# Patient Record
Sex: FEMALE | Race: WHITE | NOT HISPANIC OR LATINO | Employment: OTHER | URBAN - METROPOLITAN AREA
[De-identification: names, ages, dates, MRNs, and addresses within clinical notes are randomized per-mention and may not be internally consistent; named-entity substitution may affect disease eponyms.]

---

## 2020-03-20 ENCOUNTER — HOSPITAL ENCOUNTER (EMERGENCY)
Facility: HOSPITAL | Age: 65
Discharge: HOME | End: 2020-03-20
Attending: EMERGENCY MEDICINE
Payer: COMMERCIAL

## 2020-03-20 ENCOUNTER — APPOINTMENT (EMERGENCY)
Dept: RADIOLOGY | Facility: HOSPITAL | Age: 65
End: 2020-03-20
Attending: EMERGENCY MEDICINE
Payer: COMMERCIAL

## 2020-03-20 VITALS
HEART RATE: 61 BPM | WEIGHT: 125 LBS | TEMPERATURE: 98.5 F | RESPIRATION RATE: 18 BRPM | OXYGEN SATURATION: 96 % | BODY MASS INDEX: 22.15 KG/M2 | SYSTOLIC BLOOD PRESSURE: 125 MMHG | HEIGHT: 63 IN | DIASTOLIC BLOOD PRESSURE: 66 MMHG
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
## 2020-03-20 DIAGNOSIS — S92.902A CLOSED FRACTURE OF LEFT FOOT, INITIAL ENCOUNTER: Primary | ICD-10-CM

## 2020-03-20 PROCEDURE — 99283 EMERGENCY DEPT VISIT LOW MDM: CPT | Mod: 25

## 2020-03-20 PROCEDURE — 2W3RX1Z IMMOBILIZATION OF LEFT LOWER LEG USING SPLINT: ICD-10-PCS | Performed by: EMERGENCY MEDICINE

## 2020-03-20 PROCEDURE — 73610 X-RAY EXAM OF ANKLE: CPT | Mod: LT

## 2020-03-20 PROCEDURE — 29515 APPLICATION SHORT LEG SPLINT: CPT | Mod: LT

## 2020-03-20 PROCEDURE — 63700000 HC SELF-ADMINISTRABLE DRUG: Performed by: PHYSICIAN ASSISTANT

## 2020-03-20 PROCEDURE — 73630 X-RAY EXAM OF FOOT: CPT | Mod: LT

## 2020-03-20 RX ORDER — LISINOPRIL AND HYDROCHLOROTHIAZIDE 10; 12.5 MG/1; MG/1
1 TABLET ORAL DAILY
COMMUNITY
Start: 2015-11-24 | End: 2023-03-12

## 2020-03-20 RX ORDER — IBUPROFEN 600 MG/1
600 TABLET ORAL ONCE
Status: COMPLETED | OUTPATIENT
Start: 2020-03-20 | End: 2020-03-20

## 2020-03-20 RX ORDER — METFORMIN HYDROCHLORIDE 1000 MG/1
TABLET ORAL
COMMUNITY
Start: 2020-03-07 | End: 2023-03-12

## 2020-03-20 RX ORDER — ATORVASTATIN CALCIUM 40 MG/1
TABLET, FILM COATED ORAL
COMMUNITY
Start: 2020-03-07 | End: 2024-01-20 | Stop reason: SDUPTHER

## 2020-03-20 RX ORDER — AMLODIPINE BESYLATE 2.5 MG/1
TABLET ORAL
COMMUNITY
Start: 2020-03-18 | End: 2023-03-12

## 2020-03-20 RX ADMIN — IBUPROFEN 600 MG: 600 TABLET ORAL at 22:16

## 2020-03-20 ASSESSMENT — ENCOUNTER SYMPTOMS
SHORTNESS OF BREATH: 0
FEVER: 0
ABDOMINAL PAIN: 0
BRUISES/BLEEDS EASILY: 0
ARTHRALGIAS: 1
WEAKNESS: 0
DIZZINESS: 0
JOINT SWELLING: 1
COLOR CHANGE: 1
WOUND: 0
NUMBNESS: 0
HEADACHES: 0
BACK PAIN: 0

## 2020-03-21 NOTE — DISCHARGE INSTRUCTIONS
Apply ice for 15 minutes at a time, at least 4 times daily, for the next 48-72 hours. Elevate areas of swelling above chest.     Take 600 mg of Motrin with food every 6 hours as needed for pain as instructed on the bottle    Take 650 mg of Tylenol every 4 hours as needed for pain as instructed on the bottle     Keep splint in place and no weight bearing until cleared by orthopedics    Return to the emergency department for worsening of symptoms or if you develop any new concerning symptoms including   foot numbness, toes becomes pale, unable to move toes, or increased foot pain or swelling.

## 2020-03-21 NOTE — ED PROVIDER NOTES
HPI     Chief Complaint   Patient presents with   • Lower Extremity Issue       65 y/o F with hx of HLD, HTN, T2DM presents to ED for evaluation post fall. Pt reports walking out of her neighbors home when she missed the last cement step and inverted left ankle. Pt c/o left ankle and foot pain, aching, 2/10 at rest, and 6/10 with bearing weight/movement. Unable to bear weight since fall due to pain. No medications for pain taken PTA. She felt fine prior to the fall. She did not hit her head or have LOC. She is not on blood thinners. Denies any other injuries or concerns.       History provided by:  Patient  Lower Extremity Issue   Associated symptoms: no back pain and no fever         Patient History     Past Medical History:   Diagnosis Date   • Hypertension    • Lipid disorder    • Type 2 diabetes mellitus (CMS/HCC)        History reviewed. No pertinent surgical history.    History reviewed. No pertinent family history.    Social History     Tobacco Use   • Smoking status: Never Smoker   • Smokeless tobacco: Never Used   Substance Use Topics   • Alcohol use: Not Currently   • Drug use: Never       Systems Reviewed from Nursing Triage:          Review of Systems     Review of Systems   Constitutional: Negative for fever.   HENT: Positive for hearing loss.    Respiratory: Negative for shortness of breath.    Cardiovascular: Negative for chest pain.   Gastrointestinal: Negative for abdominal pain.   Musculoskeletal: Positive for arthralgias (left foot/ankle), gait problem and joint swelling (left foot). Negative for back pain.   Skin: Positive for color change (bruising left foot). Negative for wound.   Neurological: Negative for dizziness, syncope, weakness, numbness and headaches.   Hematological: Does not bruise/bleed easily.        Physical Exam     ED Triage Vitals [03/20/20 2126]   Temp Heart Rate Resp BP SpO2   36.9 °C (98.5 °F) 61 18 125/66 96 %      Temp Source Heart Rate Source Patient Position BP Location  "FiO2 (%) (Set)   Tympanic -- Sitting Right upper arm --       Pulse Ox %: 96 % (03/20/20 2126)  Pulse Ox Interpretation: Normal (03/20/20 2126)          Patient Vitals for the past 24 hrs:   BP Temp Temp src Pulse Resp SpO2 Height Weight   03/20/20 2126 125/66 36.9 °C (98.5 °F) Tympanic 61 18 96 % 1.6 m (5' 3\") 56.7 kg (125 lb)                                       Physical Exam   Constitutional: She appears well-developed and well-nourished.   HENT:   Head: Normocephalic and atraumatic.   [place]   Neck: Neck supple. No spinous process tenderness and no muscular tenderness present.   Cardiovascular:   Pulses:       Dorsalis pedis pulses are 2+ on the left side.        Posterior tibial pulses are 2+ on the left side.   Pulmonary/Chest: No respiratory distress.   Musculoskeletal:   Mild TTP left 4th-5th MTP TTP, mild swelling proximal 5th MTP, no deformity or any other foot TTP    Mild lateral distal malleolus TTP, no swelling or deformity    Left plantarflexion and extension intact    Left proximal tib/fib NTTP, no swelling     FAROM BL UE, RLE, left hip, left knee without pain, no swelling or bony TTP    Back: NTTP   Neurological: She is alert.   Left foot NVI   Skin: Skin is warm and dry. Capillary refill takes less than 2 seconds.   Ecchymosis left lateral proximal foot   Psychiatric: She has a normal mood and affect.   Nursing note and vitals reviewed.           Splint Application  Date/Time: 3/20/2020 11:01 PM  Performed by: Denia Fitzpatrick PA C  Authorized by: Brandyn Doherty DO     Consent:     Consent obtained:  Verbal    Consent given by:  Patient    Risks discussed:  Nerve damage, pain and vascular damage  Injury:     Injury location: left foot.  Pre-procedure assessment:     Neurological function: normal      Distal perfusion: normal      Range of motion: normal    Procedure details:     Immobilization:  Splint    Splint type: posterior.    Supplies used:  Elastic bandage, cotton padding and " Ortho-Glass  Post-procedure assessment:     Neurological function: normal      Distal perfusion: normal      Patient tolerance of procedure:  Tolerated well, no immediate complications        ED Course & MDM     Labs Reviewed - No data to display    X-RAY ANKLE LEFT 3+ VIEWS   ED Interpretation   No acute fx      Final Result   IMPRESSION:      1.  Transverse fracture through the distal left fifth metatarsal with mild   associated angulation.   2.  No acute fracture identified about the left ankle.   3.  Osteopenia.      X-RAY FOOT LEFT 3+ VIEWS   ED Interpretation   Distal 5th MTP fx      Final Result   IMPRESSION:      1.  Transverse fracture through the distal left fifth metatarsal with mild   associated angulation.   2.  No acute fracture identified about the left ankle.   3.  Osteopenia.                  MDM  Number of Diagnoses or Management Options  Closed fracture of left foot, initial encounter:   Fall, initial encounter:      Amount and/or Complexity of Data Reviewed  Tests in the radiology section of CPT®: ordered and reviewed  Independent visualization of images, tracings, or specimens: yes    Patient Progress  Patient progress: improved           ED Course as of Mar 21 1305   Fri Mar 20, 2020   2150 Plan: XR left ankle and foot to r/o fx, motrin, ice    [TC]   8330 Pt updated on fx, posterior splint applied. Will do trial with crutches. No weight bearing discussed. FU with ortho advised. APAP and Motrin for pain recommended, pt declines narcotics. Elevation and ice advised. Sx for returning to the ED reviewed. All questions answered. Pt has ride home     [TC]      ED Course User Index  [TC] Denia Fitzpatrick PA C         Clinical Impressions as of Mar 21 1305   Closed fracture of left foot, initial encounter   Fall, initial encounter     Disposition  Discharged      Denia Fitzpatrick PA C  03/21/20 1306

## 2020-03-21 NOTE — ED ATTESTATION NOTE
I have personally seen and examined the patient.  I reviewed and agree with physician assistant / nurse practitioner’s assessment and plan of care, with the following exceptions: None  My examination, assessment, and plan of care of Talita Jacques is as follows:     Exam: Well-appearing, no acute distress, awake alert oriented x3, left lower extremity with mild swelling to the lateral malleolus, tenderness to palpation to fifth metatarsal, neurovascular intact distally, no proximal fibular tenderness,    Assessment and plan: Patient with inversion injury, apparent fracture to the distal fifth metatarsal, splinted, symptomatic treatment and outpatient follow-up    Pulse ox 96% normal     Brandyn Doherty, DO  03/21/20 0114

## 2021-04-14 DIAGNOSIS — Z23 ENCOUNTER FOR IMMUNIZATION: ICD-10-CM

## 2022-07-15 ENCOUNTER — APPOINTMENT (EMERGENCY)
Dept: RADIOLOGY | Facility: HOSPITAL | Age: 67
End: 2022-07-15
Attending: EMERGENCY MEDICINE
Payer: COMMERCIAL

## 2022-07-15 ENCOUNTER — HOSPITAL ENCOUNTER (EMERGENCY)
Facility: HOSPITAL | Age: 67
Discharge: HOME | End: 2022-07-15
Attending: EMERGENCY MEDICINE
Payer: COMMERCIAL

## 2022-07-15 VITALS
BODY MASS INDEX: 22.08 KG/M2 | TEMPERATURE: 97.8 F | RESPIRATION RATE: 18 BRPM | HEART RATE: 56 BPM | HEIGHT: 62 IN | DIASTOLIC BLOOD PRESSURE: 66 MMHG | WEIGHT: 120 LBS | SYSTOLIC BLOOD PRESSURE: 142 MMHG | OXYGEN SATURATION: 96 %

## 2022-07-15 DIAGNOSIS — S52.501A CLOSED FRACTURE OF DISTAL END OF RIGHT RADIUS, UNSPECIFIED FRACTURE MORPHOLOGY, INITIAL ENCOUNTER: Primary | ICD-10-CM

## 2022-07-15 PROCEDURE — 73110 X-RAY EXAM OF WRIST: CPT | Mod: RT

## 2022-07-15 PROCEDURE — 63600000 HC DRUGS/DETAIL CODE: Performed by: PHYSICIAN ASSISTANT

## 2022-07-15 PROCEDURE — 3E033GC INTRODUCTION OF OTHER THERAPEUTIC SUBSTANCE INTO PERIPHERAL VEIN, PERCUTANEOUS APPROACH: ICD-10-PCS | Performed by: EMERGENCY MEDICINE

## 2022-07-15 PROCEDURE — 0PSHXZZ REPOSITION RIGHT RADIUS, EXTERNAL APPROACH: ICD-10-PCS | Performed by: EMERGENCY MEDICINE

## 2022-07-15 PROCEDURE — 96372 THER/PROPH/DIAG INJ SC/IM: CPT

## 2022-07-15 PROCEDURE — 2W3CX1Z IMMOBILIZATION OF RIGHT LOWER ARM USING SPLINT: ICD-10-PCS | Performed by: EMERGENCY MEDICINE

## 2022-07-15 PROCEDURE — 99283 EMERGENCY DEPT VISIT LOW MDM: CPT | Mod: 25

## 2022-07-15 PROCEDURE — 73100 X-RAY EXAM OF WRIST: CPT | Mod: RT,59

## 2022-07-15 RX ORDER — HYDROMORPHONE HYDROCHLORIDE 1 MG/ML
1 INJECTION, SOLUTION INTRAMUSCULAR; INTRAVENOUS; SUBCUTANEOUS ONCE
Status: COMPLETED | OUTPATIENT
Start: 2022-07-15 | End: 2022-07-15

## 2022-07-15 RX ORDER — OXYCODONE AND ACETAMINOPHEN 5; 325 MG/1; MG/1
1 TABLET ORAL EVERY 6 HOURS PRN
Qty: 10 TABLET | Refills: 0 | Status: SHIPPED | OUTPATIENT
Start: 2022-07-15 | End: 2022-07-19

## 2022-07-15 RX ADMIN — HYDROMORPHONE HYDROCHLORIDE 1 MG: 1 INJECTION, SOLUTION INTRAMUSCULAR; INTRAVENOUS; SUBCUTANEOUS at 11:33

## 2022-07-15 ASSESSMENT — ENCOUNTER SYMPTOMS
BRUISES/BLEEDS EASILY: 0
COLOR CHANGE: 1
BACK PAIN: 0
WEAKNESS: 0
NECK PAIN: 0
DIZZINESS: 0
WOUND: 1
NUMBNESS: 0
FEVER: 0
FACIAL SWELLING: 0

## 2022-07-15 NOTE — ED ATTESTATION NOTE
I have personally seen and examined the patient.  I reviewed and agree with physician assistant / nurse practitioner’s assessment and plan of care.     Exam: Evaluation of the patient's right upper extremity reveals significant deformity at the distal radius concerning for fracture.  The upper extremity is otherwise unremarkable.  The extremity is neurovascularly intact.  Patient is otherwise atraumatic.    Plan: Patient suffer from a FOOSH injury to the right wrist.  Will check x-rays to further evaluate.  Will apply ice.  Patient was offered pain medications but declined at this time           Marc Bashir DO  07/15/22 1038

## 2022-07-15 NOTE — ED PROVIDER NOTES
Emergency Medicine Note  HPI   HISTORY OF PRESENT ILLNESS     67-year-old female history of hypertension, hyperlipidemia, type 2 diabetes presents emergency room for evaluation of right wrist pain.  Patient was walking her dogs down her wooden steps today when they pulled the leash out of her hand, she fell forward onto her outstretched right hand complains of pain and a deformity to her right wrist.  She does not think that she hit her head, although she does have bruising to her nasal bridge.  She did not lose consciousness, she did not feel dizzy or weak prior to the fall.  No other injury sustained.  Tetanus UTD.      History provided by:  Patient        Patient History   PAST HISTORY     Reviewed from Nursing Triage:         Past Medical History:   Diagnosis Date   • Hypertension    • Lipid disorder    • Type 2 diabetes mellitus (CMS/HCC)        History reviewed. No pertinent surgical history.    History reviewed. No pertinent family history.    Social History     Tobacco Use   • Smoking status: Never Smoker   • Smokeless tobacco: Never Used   Substance Use Topics   • Alcohol use: Not Currently   • Drug use: Never         Review of Systems   REVIEW OF SYSTEMS     Review of Systems   Constitutional: Negative for fever.   HENT: Negative for facial swelling and nosebleeds.    Musculoskeletal: Negative for back pain and neck pain.   Skin: Positive for color change and wound.   Allergic/Immunologic: Negative for immunocompromised state.   Neurological: Negative for dizziness, weakness and numbness.   Hematological: Does not bruise/bleed easily.   All other systems reviewed and are negative.        VITALS     ED Vitals    Date/Time Temp Pulse Resp BP SpO2 [facility]   07/15/22 1008 36.6 °C (97.8 °F) 56 18 142/66 96 % DMM                       Physical Exam   PHYSICAL EXAM     Physical Exam  Vitals and nursing note reviewed.   Constitutional:       Appearance: Normal appearance.   HENT:      Head: Normocephalic.       Comments: Ecchymosis and slight rightward deformity of nasal bridge, minimal tenderness, no septal hematoma  Chest:      Chest wall: No tenderness.   Musculoskeletal:      Right shoulder: Normal.      Left shoulder: Normal.      Right elbow: Normal.      Right wrist: Swelling, deformity (ventral deformity) and bony tenderness (diffuse bony) present. No effusion or lacerations. Decreased range of motion (unable to flex past 75 degrees 2/2 deformity). Normal pulse.      Comments: Sensation intact, intact  strength, <2 sec cap refill, color and temperature normal   Skin:     General: Skin is warm and dry.      Comments: Small skin tear to left forearm   Neurological:      General: No focal deficit present.      Mental Status: She is alert and oriented to person, place, and time.           PROCEDURES     Procedures     DATA     Results     None          Imaging Results          X-RAY WRIST RIGHT 2 VIEWS (Final result)  Result time 07/15/22 12:45:17   Procedure changed from X-RAY WRIST RIGHT 3+ VIEWS     Final result                 Impression:    IMPRESSION: No significant change status post cast placement             Narrative:    CLINICAL HISTORY: Status post reduction right wrist fracture.    COMMENT: 2 views of the right wrist were performed July 15, 2022 at 1219 hours.  This study is compared to the previous examination performed at 1039 hours.  There is overlying cast material which obscures detail. Radial fracture is not  significantly changed.                               X-RAY WRIST RIGHT 3+ VIEWS (Final result)  Result time 07/15/22 11:30:55    Final result                 Impression:    IMPRESSION: Suspect acute on chronic distal radial fracture.             Narrative:    Right wrist x-rays    CLINICAL HISTORY: Injury.    COMPARISON: February 14, 2010.    COMMENT: 4 views of the right wrist obtained. A fracture lucency is noted  through the distal radius with some callus formation and surrounding  sclerosis  suggesting healed fibrous union, however, there is suggestion of a acute on  chronic fracture based on the oblique images. Carpal bones are intact.  Degenerative changes at the first CMC joint.                                No orders to display       Scoring tools                                 ED Course & MDM   MDM / ED COURSE / CLINICAL IMPRESSIONS / DISPO     MDM    ED Course as of 07/15/22 1359   Fri Jul 15, 2022   1104 Angulated right distal radius fracture seen on xray  Call to ortho    Based on physical exam, I discussed the possibility of nasal fracture with patient. No septal hematoma. No indication for further imaging, patient can f/u as needed [EK]   1115 Ortho resident will evaluate patient  Pain meds ordered [EK]   1158 Ortho resident at bedside [EK]   1217 Ortho resident reduced and splinted patient, will obtain postreduction films.  Plan to discharge home with pain medication and follow-up with Dr. Galvan within 1 week [EK]   1310 Postreduction film unchanged  D/w ortho resident, OK for d/c home to f/u as outpatient with Dr. Galvan to discuss surgery  Pain meds sent to pt's pharmacy [EK]      ED Course User Index  [EK] Miley Engle PA C         Clinical Impressions as of 07/15/22 1359   Closed fracture of distal end of right radius, unspecified fracture morphology, initial encounter              Miley Engle PA C  07/15/22 1358       Miley Engle PA C  07/15/22 1359

## 2022-07-15 NOTE — CONSULTS
Orthopaedic Surgery Consult    CC: Right wrist pain    SUBJECTIVE     HPI: Talita Jacques is a 67 y.o. female with PMH HTN, HLD, DM and history of right wrist fracture s/p ORIF by Dr. Galvan s/p plate removal many years ago who is presenting with right wrist pain after a fall, orthopaedic surgery consulted for right distal radius fracture.    Patient was walking her dog earlier today when the dog pulled on her leash and she fell on an outstretched hand. She does also have bruising to her nose and scrape along left arm. She was brought to ER by her . Her pain is controlled at rest, worse with movement of right forearm.    Patient denies any numbness or tingling. She denies significant pain to any other extremity. She is afebrile and hemodynamically stable.    Baseline ambulatory status: independent ambulator    PMH:  Past Medical History:   Diagnosis Date   • Hypertension    • Lipid disorder    • Type 2 diabetes mellitus (CMS/HCC)        PSH:  History reviewed. No pertinent surgical history.    Meds:  No current facility-administered medications on file prior to encounter.     Current Outpatient Medications on File Prior to Encounter   Medication Sig Dispense Refill   • amLODIPine (NORVASC) 2.5 mg tablet      • atorvastatin (LIPITOR) 40 mg tablet      • lisinopriL-hydrochlorothiazide (PRINZIDE,ZESTORETIC) 10-12.5 mg per tablet Take 1 tablet by mouth daily.     • metFORMIN (GLUCOPHAGE) 1,000 mg tablet          Allergies:   No Known Allergies    SH:   Social History     Socioeconomic History   • Marital status:      Spouse name: Not on file   • Number of children: Not on file   • Years of education: Not on file   • Highest education level: Not on file   Occupational History   • Not on file   Tobacco Use   • Smoking status: Never Smoker   • Smokeless tobacco: Never Used   Substance and Sexual Activity   • Alcohol use: Not Currently   • Drug use: Never   • Sexual activity: Defer   Other Topics Concern   •  Not on file   Social History Narrative   • Not on file     Social Determinants of Health     Financial Resource Strain: Not on file   Food Insecurity: No Food Insecurity   • Worried About Running Out of Food in the Last Year: Never true   • Ran Out of Food in the Last Year: Never true   Transportation Needs: Not on file   Physical Activity: Not on file   Stress: Not on file   Social Connections: Not on file   Intimate Partner Violence: Not on file   Housing Stability: Not on file           ROS:  CV: Denies CP or Palpitations.  Pulm: Denies SOB or Pain with inspiration      OBJECTIVE  Vitals:    07/15/22 1008   BP: (!) 142/66   Pulse: (!) 56   Resp: 18   Temp: 36.6 °C (97.8 °F)   SpO2: 96%       CBC Results    No lab values to display.         Physical Exam:    General  No acute distress  AAOx3    Right Upper Extremity  Inspection: Mild dorsal deformity of distal forearm, skin closed with no open wounds  Palpation: TTP over distal radius  Motor Exam: Fires D/B/T/WF/WE/FF/HI  Sensory: SILT in A/M/R/U Nerve Distributions  Vascular: Palpable radial pulse, Brisk capillary refill  ROM: Wrist ROM limited in setting of acute fracture    Left Upper Extremity  Inspection: Superficial scrape to the skin of the dorsal forearm, no obvious deformity  Palpation: No bony tenderness throughout  Motor Exam: Fires D/B/T/WF/WE/FF/HI  Sensory: SILT in A/M/R/U Nerve Distributions  Vascular: Palpable radial pulse, Brisk capillary refill  ROM: Full, passive and active    Right Lower Extremity  Inspection: Atraumatic, no obvious deformity, skin intact  Palpation: No TTP throughout  Motor: Fires IP/Q/TA/EHL/GS  Sensory: SILT SPN/DPN/T/S/S distributions  Vascular: Palpable DP/PT pulses, Toes WWP  ROM: Full, passive and active    Left Lower Extremity  Inspection: Atraumatic, no obvious deformity, skin intact  Palpation: No TTP throughout  Motor: Fires IP/Q/TA/EHL/GS  Sensory: SILT SPN/DPN/T/S/S distributions  Vascular: Palpable DP/PT pulses,  Toes WWP  ROM: Full, passive and active    Imaging:  I have reviewed the Imaging from the last 24 hrs. XRs of the right wrist demonstrate dorsally displaced extra-articular distal radius fracture      ASSESSMENT & PLAN   67 y.o. female with prior R wrist fracture ORIF s/p hardware removal presenting with acute closed NV intact right distal radius fracture    -- 8 ccs of 1% lidocaine administered into fracture site for hematoma block  -- Closed fracture reduction and sugar tong splint performed at bedside in ER  -- Post reduction XRs show adequate alignment, post NV exam stable  -- Patient prefers to follow up with Dr. Galvan for further management and care    Albert Molina MD  PGY-2, Orthopaedic Surgery

## 2022-07-15 NOTE — DISCHARGE INSTRUCTIONS
Alternate tylenol and ibuprofen as needed for mild to moderate pain. Do not take the prescribed pain medication within 4 hours of taking tylenol as it also contains tylenol, do not exceed 3g tylenol (acetaminophen) per day.    Elevate your right wrist at rest to help reduce swelling.    Do not get the splint wet, wear a plastic bag over the splint while bathing.

## 2023-03-12 ENCOUNTER — APPOINTMENT (EMERGENCY)
Dept: RADIOLOGY | Facility: HOSPITAL | Age: 68
End: 2023-03-12
Payer: COMMERCIAL

## 2023-03-12 ENCOUNTER — HOSPITAL ENCOUNTER (EMERGENCY)
Facility: HOSPITAL | Age: 68
Discharge: HOME | End: 2023-03-12
Attending: EMERGENCY MEDICINE
Payer: COMMERCIAL

## 2023-03-12 VITALS
TEMPERATURE: 98.6 F | WEIGHT: 125 LBS | HEIGHT: 62 IN | BODY MASS INDEX: 23 KG/M2 | HEART RATE: 58 BPM | RESPIRATION RATE: 16 BRPM | SYSTOLIC BLOOD PRESSURE: 124 MMHG | DIASTOLIC BLOOD PRESSURE: 64 MMHG | OXYGEN SATURATION: 95 %

## 2023-03-12 DIAGNOSIS — M54.50 ACUTE LEFT-SIDED LOW BACK PAIN WITHOUT SCIATICA: Primary | ICD-10-CM

## 2023-03-12 PROCEDURE — 72100 X-RAY EXAM L-S SPINE 2/3 VWS: CPT

## 2023-03-12 PROCEDURE — 63700000 HC SELF-ADMINISTRABLE DRUG: Performed by: EMERGENCY MEDICINE

## 2023-03-12 PROCEDURE — 63700000 HC SELF-ADMINISTRABLE DRUG: Performed by: PHYSICIAN ASSISTANT

## 2023-03-12 PROCEDURE — 99283 EMERGENCY DEPT VISIT LOW MDM: CPT | Mod: 25

## 2023-03-12 RX ORDER — CYCLOBENZAPRINE HCL 5 MG
10 TABLET ORAL ONCE
Status: COMPLETED | OUTPATIENT
Start: 2023-03-12 | End: 2023-03-12

## 2023-03-12 RX ORDER — LISINOPRIL 10 MG/1
10 TABLET ORAL DAILY
COMMUNITY
Start: 2023-02-18 | End: 2024-01-20 | Stop reason: SDUPTHER

## 2023-03-12 RX ORDER — LIDOCAINE 560 MG/1
1 PATCH PERCUTANEOUS; TOPICAL; TRANSDERMAL ONCE
Status: DISCONTINUED | OUTPATIENT
Start: 2023-03-12 | End: 2023-03-12 | Stop reason: HOSPADM

## 2023-03-12 RX ORDER — ACETAMINOPHEN 325 MG/1
975 TABLET ORAL ONCE
Status: COMPLETED | OUTPATIENT
Start: 2023-03-12 | End: 2023-03-12

## 2023-03-12 RX ORDER — ALENDRONATE SODIUM 70 MG/1
70 TABLET ORAL WEEKLY
COMMUNITY
Start: 2023-01-09 | End: 2023-12-11 | Stop reason: ALTCHOICE

## 2023-03-12 RX ORDER — METHYLPREDNISOLONE 4 MG/1
4 TABLET ORAL SEE ADMIN INSTRUCTIONS
Qty: 1 TABLET | Refills: 0 | Status: SHIPPED | OUTPATIENT
Start: 2023-03-12 | End: 2023-06-11 | Stop reason: ALTCHOICE

## 2023-03-12 RX ORDER — CYCLOBENZAPRINE HCL 10 MG
10 TABLET ORAL 3 TIMES DAILY PRN
Qty: 15 TABLET | Refills: 0 | Status: SHIPPED | OUTPATIENT
Start: 2023-03-12 | End: 2023-12-11 | Stop reason: ALTCHOICE

## 2023-03-12 RX ORDER — METFORMIN HYDROCHLORIDE 500 MG/1
500 TABLET ORAL 2 TIMES DAILY WITH MEALS
COMMUNITY
Start: 2023-02-17 | End: 2023-12-19 | Stop reason: SDUPTHER

## 2023-03-12 RX ADMIN — LIDOCAINE 1 PATCH: 4 PATCH TOPICAL at 14:16

## 2023-03-12 RX ADMIN — CYCLOBENZAPRINE HYDROCHLORIDE 10 MG: 5 TABLET, FILM COATED ORAL at 14:52

## 2023-03-12 RX ADMIN — ACETAMINOPHEN 975 MG: 325 TABLET ORAL at 14:15

## 2023-03-12 ASSESSMENT — ENCOUNTER SYMPTOMS
FEVER: 0
WEAKNESS: 0
NUMBNESS: 0
ABDOMINAL PAIN: 0
DYSURIA: 0
CHILLS: 0

## 2023-03-12 NOTE — ED ATTESTATION NOTE
I have personally seen and examined Talita Jacques. I personally performed the key components of the encounter and provided a substantive portion of the care and medical decision making for this patient.    I reviewed and agree with physician assistant’s assessment and plan of care, with any exceptions as documented below.     My focused history, examination, assessment, and plan of care of Talita Jacques is as follows:    Brief History:    67F w/DM, htn, p/w left lower back pain for the past 6 days following carrying her >35 lbs grandchild.  No saddle anesthesia, urinary retention nor stool incontinence.  No bloody stools, urinary sx, nor hematuria.  Denies any LE numb/weak.  Describes the pain as squeezing  Focused Physical Exam:   Vitals:    03/12/23 1345   BP: (!) 123/55   Pulse: 75   Resp: 16   Temp: 37 °C (98.6 °F)   SpO2: 97%   aa, good eye contact, pleasant, cooperative, no resp distress, full dorsiflexion of rustam great toes with intact full sensations to light touch in rustam LE.  Mild left lower perispinal soft tissue ttp without any swelling, midline stepoffs nor tenderness      Assessment / Plan / MDM:  Clinical history and exam concerning for but not limited to:  Lumbar radiculopathy  Will prpvde flexeril, tylenol, aspercream  Obtain xr       Dieudonne Mueller MD  03/12/23 1418

## 2023-03-12 NOTE — DISCHARGE INSTRUCTIONS
Do not drink drive or operate machinery while taking flexeril;  The steriods will effect your blood sugar please watch them closely ;

## 2023-03-12 NOTE — ED PROVIDER NOTES
Emergency Medicine Note  HPI   HISTORY OF PRESENT ILLNESS     Patient reports she has had 5 or 6 days of left-sided low back pain into her left buttocks without radiation to her legs.  States is worse with movement and twisting better with meaning still.  Patient states she did take Tylenol earlier in the week with minimal relief.  Denies any fall injury or trauma.  She denies  any fever chills or night sweats.  She states she had no saddle anesthesia.  No bowel or bladder dysfunction.  Patient states she does have a 30 pound grandchild thought maybe she strained her back as the pain has been persistent over the last 5 to 6 days she came to the ER today for evaluation and treatment.            Patient History   PAST HISTORY     Reviewed from Nursing Triage:       Past Medical History:   Diagnosis Date   • Hypertension    • Lipid disorder    • Type 2 diabetes mellitus (CMS/HCC)        Past Surgical History:   Procedure Laterality Date   • WRIST FRACTURE SURGERY Right        History reviewed. No pertinent family history.    Social History     Tobacco Use   • Smoking status: Former     Types: Cigarettes   • Smokeless tobacco: Never   Substance Use Topics   • Alcohol use: Not Currently   • Drug use: Never         Review of Systems   REVIEW OF SYSTEMS     Review of Systems   Constitutional: Negative for chills and fever.   Gastrointestinal: Negative for abdominal pain.   Genitourinary: Negative for dysuria.   Neurological: Negative for weakness and numbness.         VITALS     ED Vitals    Date/Time Temp Pulse Resp BP SpO2 [facility]   03/12/23 1537 -- 58 16 124/64 95 % TS   03/12/23 1345 37 °C (98.6 °F) 75 16 123/55 97 % RD        Pulse Ox %: 97 % (03/12/23 1408)  Pulse Ox Interpretation: Normal (03/12/23 1408)           Physical Exam   PHYSICAL EXAM     Physical Exam  Vitals and nursing note reviewed.   Constitutional:       Appearance: Normal appearance.   Eyes:      Conjunctiva/sclera: Conjunctivae normal.   Cardiovascular:       Rate and Rhythm: Normal rate and regular rhythm.   Pulmonary:      Effort: Pulmonary effort is normal.      Breath sounds: Normal breath sounds.   Abdominal:      General: Bowel sounds are normal.      Palpations: Abdomen is soft.   Musculoskeletal:         General: Normal range of motion.        Back:       Right lower leg: No edema.      Left lower leg: No edema.      Comments: Neg SLR b/l L.E.   Skin:     General: Skin is warm and dry.   Neurological:      General: No focal deficit present.      Mental Status: She is alert and oriented to person, place, and time.      Deep Tendon Reflexes:      Reflex Scores:       Patellar reflexes are 2+ on the right side and 2+ on the left side.       Achilles reflexes are 2+ on the right side and 2+ on the left side.  Psychiatric:         Mood and Affect: Mood normal.           PROCEDURES     Procedures     DATA     Results     None          Imaging Results          X-RAY LUMBAR SPINE 2 OR 3 VIEWS (Final result)  Result time 03/12/23 14:43:36    Final result                 Impression:    IMPRESSION:  No fracture or malalignment of the lumbar spine.             Narrative:    CLINICAL HISTORY: Back pain.    COMMENT: AP, lateral, and coned-down lateral views of the lumbar spine is  submitted for review.    COMPARISON: None.    There are five non-rib bearing lumbar vertebral bodies.  Vertebral body heights  are maintained.  No evidence of vertebral body fracture.  There is normal  alignment.  Disc spaces are maintained.  The paravertebral soft tissues are  within normal limits.  Anterior osteophytes are seen at T11-T12.                                No orders to display       Scoring tools                                  ED Course & MDM   MDM / ED COURSE / CLINICAL IMPRESSION / DISPO     Medical Decision Making  Acute left-sided low back pain without sciatica: acute illness or injury  Amount and/or Complexity of Data Reviewed  Radiology: ordered.      Risk  OTC  drugs.  Prescription drug management.          ED Course as of 03/12/23 2016   Sun Mar 12, 2023   1514 Griselda Mercedes MD  [phone] 3/12/2023     Narrative & Impression  CLINICAL HISTORY: Back pain.     COMMENT: AP, lateral, and coned-down lateral views of the lumbar spine is  submitted for review.     COMPARISON: None.     There are five non-rib bearing lumbar vertebral bodies.  Vertebral body heights  are maintained.  No evidence of vertebral body fracture.  There is normal  alignment.  Disc spaces are maintained.  The paravertebral soft tissues are  within normal limits.  Anterior osteophytes are seen at T11-T12.     --  IMPRESSION:  No fracture or malalignment of the lumbar spine.   [JR]   1532 D/w pt regarding plan [JR]      ED Course User Index  [JR] Tae Bazan PA C     Clinical Impression      Acute left-sided low back pain without sciatica     _________________     ED Disposition   Discharge                   Tae Bazan PA C  03/12/23 2017

## 2023-06-11 ENCOUNTER — HOSPITAL ENCOUNTER (OUTPATIENT)
Facility: CLINIC | Age: 68
Discharge: HOME | End: 2023-06-11
Attending: FAMILY MEDICINE
Payer: COMMERCIAL

## 2023-06-11 VITALS
SYSTOLIC BLOOD PRESSURE: 132 MMHG | HEART RATE: 65 BPM | TEMPERATURE: 97.6 F | DIASTOLIC BLOOD PRESSURE: 64 MMHG | RESPIRATION RATE: 20 BRPM | OXYGEN SATURATION: 96 %

## 2023-06-11 DIAGNOSIS — L50.9 HIVES: Primary | ICD-10-CM

## 2023-06-11 PROCEDURE — S9083 URGENT CARE CENTER GLOBAL: HCPCS | Performed by: NURSE PRACTITIONER

## 2023-06-11 PROCEDURE — 99203 OFFICE O/P NEW LOW 30 MIN: CPT | Performed by: NURSE PRACTITIONER

## 2023-06-11 RX ORDER — PREDNISONE 10 MG/1
TABLET ORAL
Qty: 17 TABLET | Refills: 0 | Status: SHIPPED | OUTPATIENT
Start: 2023-06-11 | End: 2023-12-11 | Stop reason: ALTCHOICE

## 2023-06-11 RX ORDER — FAMOTIDINE 10 MG/1
20 TABLET ORAL ONCE
Status: COMPLETED | OUTPATIENT
Start: 2023-06-11 | End: 2023-06-11

## 2023-06-11 RX ORDER — TRIAMCINOLONE ACETONIDE 1 MG/G
CREAM TOPICAL 2 TIMES DAILY
Qty: 30 G | Refills: 0 | Status: SHIPPED | OUTPATIENT
Start: 2023-06-11 | End: 2023-12-11 | Stop reason: ALTCHOICE

## 2023-06-11 RX ORDER — FEXOFENADINE HCL 60 MG/1
180 TABLET, FILM COATED ORAL ONCE
Status: COMPLETED | OUTPATIENT
Start: 2023-06-11 | End: 2023-06-11

## 2023-06-11 RX ADMIN — FAMOTIDINE 20 MG: 10 TABLET ORAL at 20:13

## 2023-06-11 RX ADMIN — FEXOFENADINE HCL 180 MG: 60 TABLET, FILM COATED ORAL at 20:14

## 2023-06-11 ASSESSMENT — ENCOUNTER SYMPTOMS
JOINT SWELLING: 0
NECK STIFFNESS: 0
NAUSEA: 0
CHILLS: 0
SHORTNESS OF BREATH: 0
BACK PAIN: 0
HEADACHES: 0
DIARRHEA: 0
SORE THROAT: 0
WOUND: 0
ABDOMINAL PAIN: 0
RHINORRHEA: 0
SINUS PRESSURE: 0
NUMBNESS: 0
SINUS PAIN: 0
LIGHT-HEADEDNESS: 0
ADENOPATHY: 0
COUGH: 0
VOMITING: 0
NECK PAIN: 0
DIZZINESS: 0
ARTHRALGIAS: 0
WEAKNESS: 0
MYALGIAS: 0
FEVER: 0

## 2023-06-12 NOTE — DISCHARGE INSTRUCTIONS
We gave you a dose of Pepcid 20 mg and allegra 180mg today. You can continue taking this medication once per day for the next couple days.  Take a non-drowsy over-the-counter allergy medication (Claritin, Allegra or Zyrtec) during the day and Benadryl in evenings.  These medications will help with symptoms  Apply triamcinolone cream twice per day to affected area for up to 2 weeks.    We are prescribing you a prednisone taper to take if your symptoms worsen or are not controlled with antihistamines. Be sure to check your blood sugar while on the steroids.    If symptoms do not improve, follow-up with your primary care doctor or a dermatologist/allergist.    If symptoms worsen or if develop chest pain, shortness of breath, trouble swallowing or breathing, call 911 and go to the nearest emergency department.    Thank you for choosing [facility]!

## 2023-06-12 NOTE — ED PROVIDER NOTES
Emergency Medicine Note  HPI   HISTORY OF PRESENT ILLNESS     66 y/o female w/ h/o DM presents to  c/o hives. Reports she developed itchy arms late this morning that eventually spread to chest and abd which appears to be caused by hives. Denies any new exposures including but not limited to soaps, detergents, animals, meds, foods, or anything else new. Did apply benadryl cream which did help relieve the itching. Denies fever, chills, facial swelling, tongue/lip/throat swelling, trouble breathing, SOB, chest pain, abd pain, N/V/D, or headaches. Pt offers no other complaints currently.             Patient History   PAST HISTORY     Reviewed from Nursing Triage:       Past Medical History:   Diagnosis Date   • Hypertension    • Lipid disorder    • Type 2 diabetes mellitus (CMS/HCC)        Past Surgical History:   Procedure Laterality Date   • WRIST FRACTURE SURGERY Right        No family history on file.    Social History     Tobacco Use   • Smoking status: Former     Types: Cigarettes   • Smokeless tobacco: Never   Substance Use Topics   • Alcohol use: Not Currently   • Drug use: Never         Review of Systems   REVIEW OF SYSTEMS     Review of Systems   Constitutional: Negative for chills and fever.   HENT: Negative for congestion, rhinorrhea, sinus pressure, sinus pain and sore throat.    Respiratory: Negative for cough and shortness of breath.    Cardiovascular: Negative for chest pain.   Gastrointestinal: Negative for abdominal pain, diarrhea, nausea and vomiting.   Musculoskeletal: Negative for arthralgias, back pain, joint swelling, myalgias, neck pain and neck stiffness.   Skin: Positive for rash. Negative for wound.   Neurological: Negative for dizziness, weakness, light-headedness, numbness and headaches.   Hematological: Negative for adenopathy.   All other systems reviewed and are negative.        VITALS     ED Vitals    Date/Time Temp Pulse Resp BP SpO2 [facility]   06/11/23 1954 36.4 °C (97.6 °F) 65 20 132/64  96 % SHARA                       Physical Exam   PHYSICAL EXAM     Physical Exam  Vitals reviewed.   Constitutional:       Appearance: She is well-developed. She is not ill-appearing or toxic-appearing.      Comments: Airway patent, pt speaking in full sentences, no facial swelling   HENT:      Head: Normocephalic and atraumatic.      Nose: Nose normal.      Mouth/Throat:      Lips: Pink.      Mouth: Mucous membranes are moist.      Pharynx: Oropharynx is clear. No pharyngeal swelling or posterior oropharyngeal erythema.   Cardiovascular:      Rate and Rhythm: Normal rate and regular rhythm.   Pulmonary:      Effort: Pulmonary effort is normal.      Breath sounds: Normal breath sounds. No wheezing.   Musculoskeletal:         General: Normal range of motion.   Skin:     General: Skin is warm and dry.      Capillary Refill: Capillary refill takes less than 2 seconds.      Findings: Rash present. Rash is urticarial.          Neurological:      General: No focal deficit present.      Mental Status: She is alert and oriented to person, place, and time. Mental status is at baseline.   Psychiatric:         Behavior: Behavior is cooperative.         Thought Content: Thought content normal.           PROCEDURES     Procedures     DATA     Results     None              No orders to display       Scoring tools                                  ED Course & MDM   MDM / ED COURSE / CLINICAL IMPRESSION / DISPO     Medical Decision Making  Patient presents c/o hives to arms and trunk. Unknown allergen.  Patient appears well; NAD.  VSS.  Healing hives noted to bilateral forearm, chest, and abd.   Lungs CTA; oropharynx clear without edema or erythema.  Given allegra 180mg and Pepcid 20 mg in office.  Continue Pepcid 20 mg x4 days, OTC non-drowsy antihistamine (Claritin, Allegra or Zyrtec) during the day and Benadryl in evenings (advised not to drive while taking Benadryl d/t possible SE of drowsiness). Topical triamcinolone cream  BID.  Offered steroid, pt declined due sx improvement and being diabetic. Will prescribe prednisone taper if sx return or worsen.  Instructed to check blood sugars while on steroids.  Recommended f/u with PCP or derm and/or allergist.  Advised pt to call 911 and report to nearest emergency dept if he develops chest pain, shortness of breath, difficulty breathing or swallowing, or otherwise concerned.  Patient verbalized understanding and agreeable with plan.      Hives: acute illness or injury         Clinical Impression      Hives     _________________     ED Disposition   Discharge                   Johana Sanchez CRNP  06/11/23 2029

## 2023-06-12 NOTE — ED ATTESTATION NOTE
The patient was evaluated and managed by the nurse practitioner.  I was present in the office and provided direct supervision.  I have reviewed and agree with the diagnosis and treatment plan.       Pinky Hobson DO  06/11/23 6932

## 2023-08-08 ENCOUNTER — TELEPHONE (OUTPATIENT)
Dept: SCHEDULING | Facility: CLINIC | Age: 68
End: 2023-08-08
Payer: COMMERCIAL

## 2023-08-08 NOTE — TELEPHONE ENCOUNTER
New Patient Appointment Request    Name of caller: Talita Jacques    Reason for Visit: Aortic Stenosis     Insurance: [place] [id number]    Recent Cardiac Test/Procedures: EKG in November    Referred by: Dr. Swann Deakelly     Primary Care Physician: Chapis Redmond MD    Previous Cardiologist name and phone number: none    Best contact number: Chapis Redmond MD    Additional notes/History: Scheduled 9/6/23

## 2023-09-06 ENCOUNTER — TELEPHONE (OUTPATIENT)
Dept: CARDIOLOGY | Facility: CLINIC | Age: 68
End: 2023-09-06

## 2023-09-06 ENCOUNTER — OFFICE VISIT (OUTPATIENT)
Dept: CARDIOLOGY | Facility: CLINIC | Age: 68
End: 2023-09-06
Payer: COMMERCIAL

## 2023-09-06 VITALS
RESPIRATION RATE: 18 BRPM | OXYGEN SATURATION: 98 % | HEIGHT: 62 IN | HEART RATE: 62 BPM | BODY MASS INDEX: 23 KG/M2 | WEIGHT: 125 LBS

## 2023-09-06 DIAGNOSIS — I35.0 AORTIC VALVE STENOSIS, ETIOLOGY OF CARDIAC VALVE DISEASE UNSPECIFIED: ICD-10-CM

## 2023-09-06 DIAGNOSIS — E78.5 DYSLIPIDEMIA: Primary | ICD-10-CM

## 2023-09-06 PROCEDURE — 99205 OFFICE O/P NEW HI 60 MIN: CPT | Performed by: INTERNAL MEDICINE

## 2023-09-06 PROCEDURE — 3008F BODY MASS INDEX DOCD: CPT | Performed by: INTERNAL MEDICINE

## 2023-09-06 PROCEDURE — 93000 ELECTROCARDIOGRAM COMPLETE: CPT | Performed by: INTERNAL MEDICINE

## 2023-09-06 RX ORDER — BLOOD SUGAR DIAGNOSTIC
STRIP MISCELLANEOUS
COMMUNITY
Start: 2023-07-10

## 2023-09-06 RX ORDER — LANCETS 33 GAUGE
EACH MISCELLANEOUS
COMMUNITY
Start: 2023-08-22

## 2023-09-06 ASSESSMENT — ENCOUNTER SYMPTOMS
ORTHOPNEA: 0
CONSTITUTIONAL NEGATIVE: 1
WEIGHT LOSS: 0
CLAUDICATION: 0
DYSPNEA ON EXERTION: 0
WEIGHT GAIN: 0
NEAR-SYNCOPE: 0
IRREGULAR HEARTBEAT: 0

## 2023-09-06 NOTE — PROGRESS NOTES
"      Brennan Ardon III, MD, [facility], [facility]  Interventional Cardiology      [facility]  [facility]  [facility] [address]  [address] [place] [address]    TEL  [phone]  Fax:  [phone]  [facility]             Cardiology Consult     Reason for visit:   Chief Complaint   Patient presents with    Aortic snenosis       HPI     Talita Jacques is a 68 y.o. female seen for the first time today.  She reports recent examining physician told her she \"might have severe aortic stenosis.\"  Patient reports having a heart murmur dating back to the age of 23.  She has never had an echo.  She reports absolutely no symptoms of syncope near syncope lightheadedness dizziness or congestive heart failure.  The patient has no history of valvular heart disease or cardiomyopathy.  She has never had a myocardial infarction or stroke.    The patient had 1 uneventful pregnancy and has a 30-year-old son.  She is not been hospitalized overnight for anything else.  The patient does not smoke and does not drink.    Risk factors for coronary artery disease are positive for hypertension and dyslipidemia.  Patient is  diabetic on metformin.  There is no strong family history of premature CAD.  As noted the patient does not smoke.    Patient's mother succumbed to a cerebral aneurysm at the age of 59.  Her father  of liver cancer at the age of 53.  She is 1 of 9 children.  There is no family history of premature CAD.  The patient is a retired .  She spends a fair amount of time with her 2-1/2-year-old granddaughter.  The patient walks her dog multiple times a day.    Past Medical History:   Diagnosis Date    Hypertension     Lipid disorder     Type 2 diabetes mellitus (CMS/HCC)      Past Surgical History:   Procedure Laterality Date    WRIST FRACTURE SURGERY Right      Patient has no known allergies.    Current Outpatient Medications:     alendronate " (FOSAMAX) 70 mg tablet, Take 70 mg by mouth once a week., Disp: , Rfl:     atorvastatin (LIPITOR) 40 mg tablet, , Disp: , Rfl:     lisinopriL (PRINIVIL) 10 mg tablet, Take 10 mg by mouth daily., Disp: , Rfl:     metFORMIN (GLUCOPHAGE) 500 mg tablet, Take 500 mg by mouth 2 (two) times a day with meals., Disp: , Rfl:     ONETOUCH DELICA PLUS LANCET 33 gauge misc, TEST EVERY DAY, Disp: , Rfl:     ONETOUCH ULTRA TEST strip, TEST EVERY DAY, Disp: , Rfl:     cyclobenzaprine (FLEXERIL) 10 mg tablet, Take 1 tablet (10 mg total) by mouth 3 (three) times a day as needed for muscle spasms. No driving or operating heavy machinery if taking. (Patient not taking: Reported on 9/6/2023), Disp: 15 tablet, Rfl: 0    predniSONE (DELTASONE) 10 mg tablet, Take 3 tablets (30 mg total) by mouth daily for 3 days, THEN 2 tablets (20 mg total) daily for 3 days, THEN 1 tablet (10 mg total) daily for 2 days. (Patient not taking: Reported on 9/6/2023), Disp: 17 tablet, Rfl: 0    triamcinolone (KENALOG) 0.1 % cream, Apply topically 2 (two) times a day., Disp: 30 g, Rfl: 0  Social History     Socioeconomic History    Marital status:      Spouse name: None    Number of children: None    Years of education: None    Highest education level: None   Tobacco Use    Smoking status: Former     Types: Cigarettes    Smokeless tobacco: Never   Substance and Sexual Activity    Alcohol use: Not Currently    Drug use: Never    Sexual activity: Defer     Social Determinants of Health     Food Insecurity: No Food Insecurity (3/12/2023)    Hunger Vital Sign     Worried About Running Out of Food in the Last Year: Never true     Ran Out of Food in the Last Year: Never true     History reviewed. No pertinent family history.     Review of Systems   Constitutional: Negative. Negative for weight gain and weight loss.   Cardiovascular: Negative for chest pain, claudication, dyspnea on exertion, irregular heartbeat, leg swelling, near-syncope  and orthopnea.   All other systems reviewed and are negative.     Objective   Vitals:    09/06/23 1519   Pulse: 62   Resp: 18   SpO2: 98%        Physical Exam  Vitals reviewed.   Constitutional:       Comments: Well-developed well-nourished female  Weight 125 pounds.  Blood pressure 130/70 both upper extremities 140 systolic both lower extremities  Skin: Warm dry fair turgor  HEENT: Normocephalic atraumatic conjunctiva are mildly pale  Neck: Neck veins flat, I hear no carotid bruits, no adenopathy or thyromegaly.  Lungs: Clear  Cardiac: Regular rate.  There is a systolic murmur at the left sternal border radiating toward the second right intercostal space peaking before mid systole.  S2 is easily auscultated.  I hear no diastolic murmurs.  Abdomen: Soft bowel sounds present no organomegaly  Extremities: Distal pulses are intact no clubbing no cyanosis no edema no palpable cords         Labs   No results found for: WBC, HGB, HCT, PLT, CHOL, TRIG, HDL, LDLDIRECT, ALT, AST, NA, K, CL, CREATININE, BUN, CO2, TSH, PSA, INR, HGBA1C, MICROALBUR    Imaging      ECHO     ECG   sinus rhythm  No acute change      1.  Murmur of aortic sclerosis.  I do not believe the patient has any significant aortic stenosis.  A transthoracic echo will be obtained.  At this point I would not start any other medications.    2.  Hypertension.  Pressure is adequately controlled on lisinopril 10 mg daily.    3.  Dyslipidemia.  The patient is tolerating atorvastatin 40 mg nightly repeat lipid profiling will be obtained.    4.  Type 2 diabetes.  Right now the patient is on metformin.  Based upon her echo I would possibly consider the addition of an SGLT2 inhibitor.    5.  Possible subclinical CAD.  The patient is not having any symptoms of angina.  Based upon her echo and lab studies I would possibly consider a stress test and/or coronary calcium score.  I will be in touch as soon as I see her studies     Brennan Ardon III, MD  9/6/2023

## 2023-09-06 NOTE — LETTER
"2023     Chapis Redmond MD  [address] [address]    Patient: Talita Jacques  YOB: 1955  Date of Visit: 2023      Dear Dr. Redmond:    Thank you for referring Talita Jacques to me for evaluation. Below are my notes for this consultation.    If you have questions, please do not hesitate to call me. I look forward to following your patient along with you.         Sincerely,        Brennan Ardon III, MD        CC: Shree Lehman Jr., Brennan Alvarado III, MD  2023  3:46 PM  Signed        Brennan Ardon III, MD, [facility], [facility]  Interventional Cardiology      [facility]  [facility]  [facility]  [address] [place] [address]    TEL  [phone]  Fax:  [phone]  [facility]             Cardiology Consult     Reason for visit:   Chief Complaint   Patient presents with    Aortic snenosis       HPI     Talita Jacques is a 68 y.o. female seen for the first time today.  She reports recent examining physician told her she \"might have severe aortic stenosis.\"  Patient reports having a heart murmur dating back to the age of 23.  She has never had an echo.  She reports absolutely no symptoms of syncope near syncope lightheadedness dizziness or congestive heart failure.  The patient has no history of valvular heart disease or cardiomyopathy.  She has never had a myocardial infarction or stroke.    The patient had 1 uneventful pregnancy and has a 30-year-old son.  She is not been hospitalized overnight for anything else.  The patient does not smoke and does not drink.    Risk factors for coronary artery disease are positive for hypertension and dyslipidemia.  Patient is  diabetic on metformin.  There is no strong family history of premature CAD.  As noted the patient does not smoke.    Patient's mother succumbed to a cerebral aneurysm at the age of 59.  Her father  " of liver cancer at the age of 53.  She is 1 of 9 children.  There is no family history of premature CAD.  The patient is a retired .  She spends a fair amount of time with her 2-1/2-year-old granddaughter.  The patient walks her dog multiple times a day.    Past Medical History:   Diagnosis Date    Hypertension     Lipid disorder     Type 2 diabetes mellitus (CMS/HCC)      Past Surgical History:   Procedure Laterality Date    WRIST FRACTURE SURGERY Right      Patient has no known allergies.    Current Outpatient Medications:     alendronate (FOSAMAX) 70 mg tablet, Take 70 mg by mouth once a week., Disp: , Rfl:     atorvastatin (LIPITOR) 40 mg tablet, , Disp: , Rfl:     lisinopriL (PRINIVIL) 10 mg tablet, Take 10 mg by mouth daily., Disp: , Rfl:     metFORMIN (GLUCOPHAGE) 500 mg tablet, Take 500 mg by mouth 2 (two) times a day with meals., Disp: , Rfl:     ONETOUCH DELICA PLUS LANCET 33 gauge misc, TEST EVERY DAY, Disp: , Rfl:     ONETOUCH ULTRA TEST strip, TEST EVERY DAY, Disp: , Rfl:     cyclobenzaprine (FLEXERIL) 10 mg tablet, Take 1 tablet (10 mg total) by mouth 3 (three) times a day as needed for muscle spasms. No driving or operating heavy machinery if taking. (Patient not taking: Reported on 9/6/2023), Disp: 15 tablet, Rfl: 0    predniSONE (DELTASONE) 10 mg tablet, Take 3 tablets (30 mg total) by mouth daily for 3 days, THEN 2 tablets (20 mg total) daily for 3 days, THEN 1 tablet (10 mg total) daily for 2 days. (Patient not taking: Reported on 9/6/2023), Disp: 17 tablet, Rfl: 0    triamcinolone (KENALOG) 0.1 % cream, Apply topically 2 (two) times a day., Disp: 30 g, Rfl: 0  Social History     Socioeconomic History    Marital status:      Spouse name: None    Number of children: None    Years of education: None    Highest education level: None   Tobacco Use    Smoking status: Former     Types: Cigarettes    Smokeless tobacco: Never   Substance and Sexual Activity    Alcohol  use: Not Currently    Drug use: Never    Sexual activity: Defer     Social Determinants of Health     Food Insecurity: No Food Insecurity (3/12/2023)    Hunger Vital Sign     Worried About Running Out of Food in the Last Year: Never true     Ran Out of Food in the Last Year: Never true     History reviewed. No pertinent family history.     Review of Systems   Constitutional: Negative. Negative for weight gain and weight loss.   Cardiovascular: Negative for chest pain, claudication, dyspnea on exertion, irregular heartbeat, leg swelling, near-syncope and orthopnea.   All other systems reviewed and are negative.     Objective   Vitals:    09/06/23 1519   Pulse: 62   Resp: 18   SpO2: 98%        Physical Exam  Vitals reviewed.   Constitutional:       Comments: Well-developed well-nourished female  Weight 125 pounds.  Blood pressure 130/70 both upper extremities 140 systolic both lower extremities  Skin: Warm dry fair turgor  HEENT: Normocephalic atraumatic conjunctiva are mildly pale  Neck: Neck veins flat, I hear no carotid bruits, no adenopathy or thyromegaly.  Lungs: Clear  Cardiac: Regular rate.  There is a systolic murmur at the left sternal border radiating toward the second right intercostal space peaking before mid systole.  S2 is easily auscultated.  I hear no diastolic murmurs.  Abdomen: Soft bowel sounds present no organomegaly  Extremities: Distal pulses are intact no clubbing no cyanosis no edema no palpable cords         Labs   No results found for: WBC, HGB, HCT, PLT, CHOL, TRIG, HDL, LDLDIRECT, ALT, AST, NA, K, CL, CREATININE, BUN, CO2, TSH, PSA, INR, HGBA1C, MICROALBUR    Imaging      ECHO     ECG   sinus rhythm  No acute change      1.  Murmur of aortic sclerosis.  I do not believe the patient has any significant aortic stenosis.  A transthoracic echo will be obtained.  At this point I would not start any other medications.    2.  Hypertension.  Pressure is adequately controlled on lisinopril 10 mg  daily.    3.  Dyslipidemia.  The patient is tolerating atorvastatin 40 mg nightly repeat lipid profiling will be obtained.    4.  Type 2 diabetes.  Right now the patient is on metformin.  Based upon her echo I would possibly consider the addition of an SGLT2 inhibitor.    5.  Possible subclinical CAD.  The patient is not having any symptoms of angina.  Based upon her echo and lab studies I would possibly consider a stress test and/or coronary calcium score.  I will be in touch as soon as I see her studies     Brennan Ardon III, MD  9/6/2023

## 2023-09-06 NOTE — TELEPHONE ENCOUNTER
[place] [facility].     Thank you      NPI: [id number]

## 2023-09-07 NOTE — TELEPHONE ENCOUNTER
Called and spoke with patient, and schedule first avail at [facility] [place], [place], Nov 6, 2023 @ 2:30 pm.    Warm

## 2023-11-03 LAB
ALBUMIN SERPL-MCNC: 4.4 G/DL (ref 3.9–4.9)
ALP SERPL-CCNC: 62 IU/L (ref 44–121)
ALT SERPL-CCNC: 21 IU/L (ref 0–32)
AST SERPL-CCNC: 25 IU/L (ref 0–40)
BILIRUB DIRECT SERPL-MCNC: <0.1 MG/DL (ref 0–0.4)
BILIRUB SERPL-MCNC: <0.2 MG/DL (ref 0–1.2)
BUN SERPL-MCNC: 23 MG/DL (ref 8–27)
BUN/CREAT SERPL: 18 (ref 12–28)
CALCIUM SERPL-MCNC: 10.9 MG/DL (ref 8.7–10.3)
CHLORIDE SERPL-SCNC: 98 MMOL/L (ref 96–106)
CHOLEST SERPL-MCNC: 173 MG/DL (ref 100–199)
CO2 SERPL-SCNC: 27 MMOL/L (ref 20–29)
CREAT SERPL-MCNC: 1.27 MG/DL (ref 0.57–1)
EGFRCR SERPLBLD CKD-EPI 2021: 45 ML/MIN/1.73
GLUCOSE SERPL-MCNC: 123 MG/DL (ref 70–99)
HDLC SERPL-MCNC: 50 MG/DL
LDLC SERPL CALC-MCNC: 96 MG/DL (ref 0–99)
POTASSIUM SERPL-SCNC: 4.6 MMOL/L (ref 3.5–5.2)
PROT SERPL-MCNC: 6.8 G/DL (ref 6–8.5)
SODIUM SERPL-SCNC: 140 MMOL/L (ref 134–144)
TRIGL SERPL-MCNC: 153 MG/DL (ref 0–149)
VLDLC SERPL CALC-MCNC: 27 MG/DL (ref 5–40)

## 2023-11-06 ENCOUNTER — HOSPITAL ENCOUNTER (OUTPATIENT)
Dept: CARDIOLOGY | Facility: HOSPITAL | Age: 68
Discharge: HOME | End: 2023-11-06
Attending: INTERNAL MEDICINE
Payer: COMMERCIAL

## 2023-11-06 VITALS
BODY MASS INDEX: 23 KG/M2 | HEIGHT: 62 IN | WEIGHT: 125 LBS | SYSTOLIC BLOOD PRESSURE: 131 MMHG | HEART RATE: 57 BPM | DIASTOLIC BLOOD PRESSURE: 67 MMHG

## 2023-11-06 DIAGNOSIS — I35.0 AORTIC VALVE STENOSIS, ETIOLOGY OF CARDIAC VALVE DISEASE UNSPECIFIED: ICD-10-CM

## 2023-11-06 LAB
AORTIC ROOT ANNULUS - M-MODE: 3 CM
AORTIC ROOT ANNULUS: 2.9 CM
AORTIC VALVE MEAN VELOCITY: 1.26 M/S
AORTIC VALVE VELOCITY TIME INTEGRAL: 38.7 CM
ASCENDING AORTA: 3.7 CM
AV MEAN GRADIENT: 7 MMHG
AV PEAK GRADIENT: 12 MMHG
AV PEAK VELOCITY-S: 1.73 M/S
AV VALVE AREA INDEX: 1.44
AV VALVE AREA: 1.74 CM2
AV VELOCITY RATIO: 0.73
AVA (VTI): 2.28 CM2
BSA FOR ECHO PROCEDURE: 1.58 M2
CUSP SEPARATION: 1.7 CM
DOP CALC LVOT STROKE VOLUME: 88.23 CM3
E WAVE DECELERATION TIME: 212 MS
E/A RATIO: 0.9
E/E' RATIO: 12.8
E/LAT E' RATIO: 11.3
EDV (BP): 127 CM3
EF (A4C): 54.4 %
EF A2C: 54.9 %
EJECTION FRACTION: 56.6 %
EST RIGHT VENT SYSTOLIC PRESSURE BY TRICUSPID REGURGITATION JET: 20 MMHG
ESV (BP): 55.1 CM3
FRACTIONAL SHORTENING: 27.86 %
HEART RATE: 57 BPM
INTERVENTRICULAR SEPTUM: 0.68 CM
LA ESV (BP): 61.1 CM3
LA ESV INDEX (A2C): 41.58 CM3/M2
LA ESV INDEX (BP): 38.67 CM3/M2
LA/AORTA RATIO: 1.34
LAAS-AP2: 20.8 CM2
LAAS-AP4: 20 CM2
LAD 2D: 3.9 CM
LALD A4C: 5.13 CM
LALD A4C: 5.46 CM
LAV-S: 65.7 CM3
LEFT ATRIUM VOLUME INDEX: 34.18 CM3/M2
LEFT ATRIUM VOLUME: 54 CM3
LEFT INTERNAL DIMENSION IN SYSTOLE: 3.91 CM (ref 2.28–3.44)
LEFT VENTRICLE DIASTOLIC VOLUME INDEX: 81.01 CM3/M2
LEFT VENTRICLE DIASTOLIC VOLUME: 128 CM3
LEFT VENTRICLE SYSTOLIC VOLUME INDEX: 36.9 CM3/M2
LEFT VENTRICLE SYSTOLIC VOLUME: 58.3 CM3
LEFT VENTRICULAR INTERNAL DIMENSION IN DIASTOLE: 5.42 CM (ref 3.83–5.32)
LEFT VENTRICULAR POSTERIOR WALL IN END DIASTOLE: 0.66 CM (ref 0.49–0.92)
LV DIASTOLIC VOLUME: 116 CM3
LV ESV (APICAL 2 CHAMBER): 52.3 CM3
LVAD-AP2: 33.8 CM2
LVAD-AP4: 37.2 CM2
LVAS-AP2: 21 CM2
LVAS-AP4: 22.4 CM2
LVEDVI(A2C): 73.42 CM3/M2
LVEDVI(BP): 80.38 CM3/M2
LVESVI(A2C): 33.1 CM3/M2
LVESVI(BP): 34.87 CM3/M2
LVLD-AP2: 8.17 CM
LVLD-AP4: 8.89 CM
LVLS-AP2: 7.12 CM
LVLS-AP4: 7.18 CM
LVOT 2D: 2 CM
LVOT A: 3.14 CM2
LVOT MG: 4 MMHG
LVOT MV: 0.85 M/S
LVOT PEAK VELOCITY: 1.22 M/S
LVOT PG: 6 MMHG
LVOT STROKE VOLUME INDEX: 55.84 ML/M2
LVOT VTI: 28.1 CM
MLH CV ECHO AVA INDEX VELOCITY RATIO: 1.1
MV E'TISSUE VEL-LAT: 0.08 M/S
MV E'TISSUE VEL-MED: 0.07 M/S
MV PEAK A VEL: 1.03 M/S
MV PEAK E VEL: 0.95 M/S
MV STENOSIS PRESSURE HALF TIME: 62 MS
MV VALVE AREA P 1/2 METHOD: 3.55 CM2
POSTERIOR WALL: 0.66 CM
PULM VEIN S/D RATIO: 1.2
PV PEAK D VEL: 0.38 M/S
PV PEAK S VEL: 0.45 M/S
RAP: 3 MMHG
RVOT VMAX: 0.65 M/S
RVOT VTI: 15.6 CM
TR MAX PG: 17.14 MMHG
TRICUSPID VALVE PEAK REGURGITATION VELOCITY: 2.07 M/S
Z-SCORE OF LEFT VENTRICULAR DIMENSION IN END DIASTOLE: 1.83
Z-SCORE OF LEFT VENTRICULAR DIMENSION IN END SYSTOLE: 2.64
Z-SCORE OF LEFT VENTRICULAR POSTERIOR WALL IN END DIASTOLE: -0.08

## 2023-11-06 PROCEDURE — 25500000 HC DRUGS/INCIDENT RAD: Performed by: INTERNAL MEDICINE

## 2023-11-06 PROCEDURE — C8929 TTE W OR WO FOL WCON,DOPPLER: HCPCS

## 2023-11-06 PROCEDURE — 93306 TTE W/DOPPLER COMPLETE: CPT | Mod: 26 | Performed by: INTERNAL MEDICINE

## 2023-11-06 RX ADMIN — SULFUR HEXAFLUORIDE: KIT at 15:15

## 2023-11-07 ENCOUNTER — TELEPHONE (OUTPATIENT)
Dept: CARDIOLOGY | Facility: CLINIC | Age: 68
End: 2023-11-07
Payer: COMMERCIAL

## 2023-11-07 NOTE — TELEPHONE ENCOUNTER
I called and spoke with patient, I let her know her echo was unremarkable.  Pumping function was normal.  No further cardiac work-up at this time is needed. Patient verbalized understanding, no follow-up scheduled at this time, she will call the office for an appointment should she have any symptoms

## 2023-11-07 NOTE — TELEPHONE ENCOUNTER
----- Message from Brennan Ardon III, MD sent at 11/7/2023  1:24 PM EST -----  Please let the patient know her echo was unremarkable.  Pumping function was normal.  No further cardiac work-up at this time is needed.

## 2023-12-11 ENCOUNTER — OFFICE VISIT (OUTPATIENT)
Dept: FAMILY MEDICINE | Facility: CLINIC | Age: 68
End: 2023-12-11
Payer: COMMERCIAL

## 2023-12-11 VITALS
OXYGEN SATURATION: 95 % | SYSTOLIC BLOOD PRESSURE: 120 MMHG | WEIGHT: 129 LBS | HEART RATE: 70 BPM | DIASTOLIC BLOOD PRESSURE: 60 MMHG | HEIGHT: 62 IN | TEMPERATURE: 98.4 F | RESPIRATION RATE: 16 BRPM | BODY MASS INDEX: 23.74 KG/M2

## 2023-12-11 DIAGNOSIS — E83.52 HYPERCALCEMIA: ICD-10-CM

## 2023-12-11 DIAGNOSIS — N18.31 STAGE 3A CHRONIC KIDNEY DISEASE (CMS/HCC): ICD-10-CM

## 2023-12-11 DIAGNOSIS — E11.22 TYPE 2 DIABETES MELLITUS WITH STAGE 3A CHRONIC KIDNEY DISEASE, WITHOUT LONG-TERM CURRENT USE OF INSULIN (CMS/HCC): ICD-10-CM

## 2023-12-11 DIAGNOSIS — M81.0 AGE-RELATED OSTEOPOROSIS WITHOUT CURRENT PATHOLOGICAL FRACTURE: Primary | ICD-10-CM

## 2023-12-11 DIAGNOSIS — Z12.31 BREAST CANCER SCREENING BY MAMMOGRAM: ICD-10-CM

## 2023-12-11 DIAGNOSIS — N18.31 TYPE 2 DIABETES MELLITUS WITH STAGE 3A CHRONIC KIDNEY DISEASE, WITHOUT LONG-TERM CURRENT USE OF INSULIN (CMS/HCC): ICD-10-CM

## 2023-12-11 DIAGNOSIS — Z23 NEED FOR IMMUNIZATION AGAINST INFLUENZA: ICD-10-CM

## 2023-12-11 PROCEDURE — 90694 VACC AIIV4 NO PRSRV 0.5ML IM: CPT | Performed by: INTERNAL MEDICINE

## 2023-12-11 PROCEDURE — 3008F BODY MASS INDEX DOCD: CPT | Performed by: INTERNAL MEDICINE

## 2023-12-11 PROCEDURE — 90471 IMMUNIZATION ADMIN: CPT | Performed by: INTERNAL MEDICINE

## 2023-12-11 PROCEDURE — 99204 OFFICE O/P NEW MOD 45 MIN: CPT | Mod: 25 | Performed by: INTERNAL MEDICINE

## 2023-12-11 ASSESSMENT — ENCOUNTER SYMPTOMS
PSYCHIATRIC NEGATIVE: 1
GASTROINTESTINAL NEGATIVE: 1
CARDIOVASCULAR NEGATIVE: 1
EYES NEGATIVE: 1
SINUS PRESSURE: 1
ALLERGIC/IMMUNOLOGIC NEGATIVE: 1
RESPIRATORY NEGATIVE: 1
NEUROLOGICAL NEGATIVE: 1
CONSTITUTIONAL NEGATIVE: 1
MUSCULOSKELETAL NEGATIVE: 1
HEMATOLOGIC/LYMPHATIC NEGATIVE: 1
ENDOCRINE NEGATIVE: 1

## 2023-12-11 ASSESSMENT — PATIENT HEALTH QUESTIONNAIRE - PHQ9: SUM OF ALL RESPONSES TO PHQ9 QUESTIONS 1 & 2: 0

## 2023-12-11 NOTE — ASSESSMENT & PLAN NOTE
History of osteoporosis, on Fosamax for years.  Referral provided to Dr. Henderson.  Continue with vitamin D.

## 2023-12-11 NOTE — PATIENT INSTRUCTIONS
Stop taking calcium supplements     Vaccine Information Statement    Influenza (Flu) Vaccine (Inactivated or Recombinant): What You Need to Know    Many vaccine information statements are available in [place] and other languages. See www.immunize.org/vis.  Hojas de información sobre vacunas están disponibles en español y en muchos otros idiomas. Visite www.immunize.org/vis.    1. Why get vaccinated?    Influenza vaccine can prevent influenza (flu).    Flu is a contagious disease that spreads around the [place] every year, usually between October and May. Anyone can get the flu, but it is more dangerous for some people. Infants and young children, people 65 years and older, pregnant people, and people with certain health conditions or a weakened immune system are at greatest risk of flu complications.    Pneumonia, bronchitis, sinus infections, and ear infections are examples of flu-related complications. If you have a medical condition, such as heart disease, cancer, or diabetes, flu can make it worse.    Flu can cause fever and chills, sore throat, muscle aches, fatigue, cough, headache, and runny or stuffy nose. Some people may have vomiting and diarrhea, though this is more common in children than adults.     In an average year, thousands of people in the [place] die from flu, and many more are hospitalized. Flu vaccine prevents millions of illnesses and flu-related visits to the doctor each year.    2. Influenza vaccines     CDC recommends everyone 6 months and older get vaccinated every flu season. Children 6 months through 8 years of age may need 2 doses during a single flu season. Everyone else needs only 1 dose each flu season.    It takes about 2 weeks for protection to develop after vaccination.    There are many flu viruses, and they are always changing. Each year a new flu vaccine is made to protect against the influenza viruses believed to be likely to cause disease in the upcoming flu  season. Even when the vaccine doesn’t exactly match these viruses, it may still provide some protection.     Influenza vaccine does not cause flu.    Influenza vaccine may be given at the same time as other vaccines.    3. Talk with your health care provider    Tell your vaccination provider if the person getting the vaccine:  Has had an allergic reaction after a previous dose of influenza vaccine, or has any severe, life-threatening allergies   Has ever had Guillain-Barré Syndrome (also called “GBS”)    In some cases, your health care provider may decide to postpone influenza vaccination until a future visit.    Influenza vaccine can be administered at any time during pregnancy. People who are or will be pregnant during influenza season should receive inactivated influenza vaccine.    People with minor illnesses, such as a cold, may be vaccinated. People who are moderately or severely ill should usually wait until they recover before getting influenza vaccine.    Your health care provider can give you more information.    4. Risks of a vaccine reaction    Soreness, redness, and swelling where the shot is given, fever, muscle aches, and headache can happen after influenza vaccination.  There may be a very small increased risk of Guillain-Barré Syndrome (GBS) after inactivated influenza vaccine (the flu shot).    Young children who get the flu shot along with pneumococcal vaccine (PCV13) and/or DTaP vaccine at the same time might be slightly more likely to have a seizure caused by fever. Tell your health care provider if a child who is getting flu vaccine has ever had a seizure.    People sometimes faint after medical procedures, including vaccination. Tell your provider if you feel dizzy or have vision changes or ringing in the ears.    As with any medicine, there is a very remote chance of a vaccine causing a severe allergic reaction, other serious injury, or death.    5. What if there is a serious problem?    An  allergic reaction could occur after the vaccinated person leaves the clinic. If you see signs of a severe allergic reaction (hives, swelling of the face and throat, difficulty breathing, a fast heartbeat, dizziness, or weakness), call 9-1-1 and get the person to the nearest hospital.    For other signs that concern you, call your health care provider.    Adverse reactions should be reported to the Vaccine Adverse Event Reporting System (VAERS). Your health care provider will usually file this report, or you can do it yourself. Visit the VAERS website at www.vaers.[facility].gov or call 1-742.922.2268. VAERS is only for reporting reactions, and VAERS staff members do not give medical advice.    6. The National Vaccine Injury Compensation Program    The National Vaccine Injury Compensation Program (VICP) is a federal program that was created to compensate people who may have been injured by certain vaccines. Claims regarding alleged injury or death due to vaccination have a time limit for filing, which may be as short as two years. Visit the VICP website at www.[facility].gov/vaccinecompensation or call 1-806.261.5986 to learn about the program and about filing a claim.     7. How can I learn more?    Ask your health care provider.   Call your local or state health department.   Visit the website of the Food and Drug Administration (FDA) for vaccine package inserts and additional information at www.fda.gov/vaccines-blood-biologics/vaccines.  Contact the Centers for Disease Control and Prevention (CDC):  Call 1-643.152.4028 (0-250-TXJ-INFO) or  Visit CDC’s influenza website at www.cdc.gov/flu.    Vaccine Information Statement   Inactivated Influenza Vaccine   8/6/2021  42 U.S.C. § 300aa-26     Department of Health and Human Services  Centers for Disease Control and Prevention

## 2023-12-11 NOTE — ASSESSMENT & PLAN NOTE
Calcium elevated on last labs.  The patient is currently taking additional calcium supplement.  Advised her to stop taking additional calcium supplement.  We will recheck on labs.

## 2023-12-11 NOTE — ASSESSMENT & PLAN NOTE
Will recheck HbA1c.  Reportedly last HbA1c was 6.3.  She is maintained on metformin 500 mg twice daily.  Up-to-date on diabetic eye exam, Dr. Alexander.  Urine microalbumin obtained today.

## 2023-12-11 NOTE — PROGRESS NOTES
Talita Jacques is a 68 y.o. female presenting to [facility] care.    HPI    Osteoporosis  Taking calcium supplement and vitamin D  Was on fosamax for years  Needs new osteoporosis doctor, previously saw Dr. Cortes    T2DM - up to date on Dr. Catherine REYNA  Last HbA1c was 6.3, reports this was many months ago    Chronic sinusitis -follows with ENT      HCM:  - up to date on mammogram and dexa scan  - colonoscopy - [facility]     Retired account  Has 1 son, 2 granddaughters    Past Medical History:   Diagnosis Date   • Hypertension    • Lipid disorder    • Type 2 diabetes mellitus (CMS/HCC)        Past Surgical History:   Procedure Laterality Date   • WRIST FRACTURE SURGERY Right         Social History     Socioeconomic History   • Marital status:      Spouse name: None   • Number of children: None   • Years of education: None   • Highest education level: None   Tobacco Use   • Smoking status: Former     Types: Cigarettes   • Smokeless tobacco: Never   Substance and Sexual Activity   • Alcohol use: Not Currently   • Drug use: Never   • Sexual activity: Defer     Social Determinants of Health     Food Insecurity: No Food Insecurity (3/12/2023)    Hunger Vital Sign    • Worried About Running Out of Food in the Last Year: Never true    • Ran Out of Food in the Last Year: Never true       History reviewed. No pertinent family history.    Amoxicillin    Current Outpatient Medications   Medication Sig Dispense Refill   • atorvastatin (LIPITOR) 40 mg tablet      • lisinopriL (PRINIVIL) 10 mg tablet Take 10 mg by mouth daily.     • metFORMIN (GLUCOPHAGE) 500 mg tablet Take 500 mg by mouth 2 (two) times a day with meals.     • ONETOUCH DELICA PLUS LANCET 33 gauge misc TEST EVERY DAY     • ONETOUCH ULTRA TEST strip TEST EVERY DAY       No current facility-administered medications for this visit.       Review of Systems   Constitutional: Negative.    HENT: Positive for sinus pressure.    Eyes: Negative.     Respiratory: Negative.    Cardiovascular: Negative.    Gastrointestinal: Negative.    Endocrine: Negative.    Genitourinary: Negative.    Musculoskeletal: Negative.    Skin: Negative.    Allergic/Immunologic: Negative.    Neurological: Negative.    Hematological: Negative.    Psychiatric/Behavioral: Negative.           Vitals:    12/11/23 1304   BP: 120/60   Pulse: 70   Resp: 16   Temp: 36.9 °C (98.4 °F)   SpO2: 95%       Body mass index is 23.59 kg/m².      Physical Exam  Vitals reviewed.   Constitutional:       General: She is not in acute distress.     Appearance: Normal appearance.   HENT:      Head: Normocephalic and atraumatic.      Right Ear: Tympanic membrane, ear canal and external ear normal.      Left Ear: Tympanic membrane, ear canal and external ear normal.      Nose: Nose normal.      Mouth/Throat:      Pharynx: No oropharyngeal exudate or posterior oropharyngeal erythema.   Eyes:      General: No scleral icterus.     Extraocular Movements: Extraocular movements intact.      Conjunctiva/sclera: Conjunctivae normal.   Cardiovascular:      Rate and Rhythm: Normal rate and regular rhythm.      Pulses: Normal pulses.      Heart sounds: Normal heart sounds.   Pulmonary:      Effort: Pulmonary effort is normal. No respiratory distress.      Breath sounds: Normal breath sounds.   Abdominal:      General: Abdomen is flat. Bowel sounds are normal. There is no distension.      Palpations: Abdomen is soft.      Tenderness: There is no abdominal tenderness.   Musculoskeletal:         General: No swelling.      Cervical back: Normal range of motion.   Lymphadenopathy:      Cervical: No cervical adenopathy.   Skin:     General: Skin is warm and dry.      Findings: No rash.   Neurological:      General: No focal deficit present.      Mental Status: She is alert. Mental status is at baseline.   Psychiatric:         Mood and Affect: Mood normal.         Behavior: Behavior normal.              Assessment/Plan       Diagnoses and all orders for this visit:    Age-related osteoporosis without current pathological fracture (Primary)  Assessment & Plan:  History of osteoporosis, on Fosamax for years.  Referral provided to Dr. Henderson.  Continue with vitamin D.    Orders:  -     Ambulatory referral to Sports Medicine; Future    Stage 3a chronic kidney disease (CMS/HCC)  Assessment & Plan:  Avoid NSAIDs, will recheck.    Orders:  -     Comprehensive metabolic panel; Future  -     CBC and Differential; Future    Breast cancer screening by mammogram  -     BI SCREENING MAMMOGRAM BILATERAL(TOMOSYNTHESIS); Future    Hypercalcemia  Assessment & Plan:  Calcium elevated on last labs.  The patient is currently taking additional calcium supplement.  Advised her to stop taking additional calcium supplement.  We will recheck on labs.    Orders:  -     Comprehensive metabolic panel; Future    Type 2 diabetes mellitus with stage 3a chronic kidney disease, without long-term current use of insulin (CMS/[facility])  Assessment & Plan:  Will recheck HbA1c.  Reportedly last HbA1c was 6.3.  She is maintained on metformin 500 mg twice daily.  Up-to-date on diabetic eye exam, Dr. Alexander.  Urine microalbumin obtained today.    Orders:  -     Hemoglobin A1c; Future  -     TSH w reflex FT4; Future  -     Microalbumin/Creatinine Ur Random    Need for immunization against influenza    Other orders  -     Influenza quadrivalent vaccine 65 and older IM preservative free        No orders of the defined types were placed in this encounter.      Medications Discontinued During This Encounter   Medication Reason   • predniSONE (DELTASONE) 10 mg tablet Therapy completed   • alendronate (FOSAMAX) 70 mg tablet Therapy completed   • cyclobenzaprine (FLEXERIL) 10 mg tablet Therapy completed   • triamcinolone (KENALOG) 0.1 % cream Therapy completed        Orders Placed This Encounter   Procedures   • BI SCREENING MAMMOGRAM BILATERAL(TOMOSYNTHESIS)     Standing Status:    Future     Standing Expiration Date:   2/11/2025     Order Specific Question:   Release to patient     Answer:   Immediate [1]   • Influenza quadrivalent vaccine 65 and older IM preservative free   • Comprehensive metabolic panel     Standing Status:   Future     Number of Occurrences:   1     Standing Expiration Date:   12/11/2024     Order Specific Question:   Release to patient     Answer:   Immediate     Order Specific Question:   Release to patient     Answer:   Immediate [1]   • CBC and Differential     Standing Status:   Future     Number of Occurrences:   1     Standing Expiration Date:   12/11/2024     Order Specific Question:   Release to patient     Answer:   Immediate     Order Specific Question:   Release to patient     Answer:   Immediate [1]   • Hemoglobin A1c     Standing Status:   Future     Number of Occurrences:   1     Standing Expiration Date:   12/11/2024     Order Specific Question:   Release to patient     Answer:   Immediate     Order Specific Question:   Release to patient     Answer:   Immediate [1]   • TSH w reflex FT4     Standing Status:   Future     Number of Occurrences:   1     Standing Expiration Date:   12/11/2024     Order Specific Question:   Release to patient     Answer:   Immediate     Order Specific Question:   Release to patient     Answer:   Immediate [1]   • Microalbumin/Creatinine Ur Random     Order Specific Question:   Release to patient     Answer:   Immediate     Order Specific Question:   Release to patient     Answer:   Immediate [1]   • Ambulatory referral to Sports Medicine     Standing Status:   Future     Standing Expiration Date:   6/11/2024     Referral Priority:   Routine     Referral Type:   Consultation     Referral Reason:   Specialty Services Required     Referred to Provider:   Manda Henderson DO     Requested Specialty:   Sports Medicine     Number of Visits Requested:   10        Noa Funes MD  12/11/2023

## 2023-12-13 LAB
ALBUMIN/CREAT UR: 36 MG/G CREAT (ref 0–29)
CREAT UR-MCNC: 53 MG/DL
MICROALBUMIN UR-MCNC: 18.9 UG/ML

## 2023-12-19 DIAGNOSIS — N18.31 TYPE 2 DIABETES MELLITUS WITH STAGE 3A CHRONIC KIDNEY DISEASE, WITHOUT LONG-TERM CURRENT USE OF INSULIN (CMS/HCC): Primary | ICD-10-CM

## 2023-12-19 DIAGNOSIS — E11.22 TYPE 2 DIABETES MELLITUS WITH STAGE 3A CHRONIC KIDNEY DISEASE, WITHOUT LONG-TERM CURRENT USE OF INSULIN (CMS/HCC): Primary | ICD-10-CM

## 2023-12-19 LAB
ALBUMIN SERPL-MCNC: 4.1 G/DL (ref 3.9–4.9)
ALBUMIN/GLOB SERPL: 2 {RATIO} (ref 1.2–2.2)
ALP SERPL-CCNC: 58 IU/L (ref 44–121)
ALT SERPL-CCNC: 21 IU/L (ref 0–32)
AST SERPL-CCNC: 20 IU/L (ref 0–40)
BASOPHILS # BLD AUTO: 0 X10E3/UL (ref 0–0.2)
BASOPHILS NFR BLD AUTO: 0 %
BILIRUB SERPL-MCNC: 0.2 MG/DL (ref 0–1.2)
BUN SERPL-MCNC: 24 MG/DL (ref 8–27)
BUN/CREAT SERPL: 19 (ref 12–28)
CALCIUM SERPL-MCNC: 9.5 MG/DL (ref 8.7–10.3)
CHLORIDE SERPL-SCNC: 103 MMOL/L (ref 96–106)
CO2 SERPL-SCNC: 23 MMOL/L (ref 20–29)
CREAT SERPL-MCNC: 1.29 MG/DL (ref 0.57–1)
EGFRCR SERPLBLD CKD-EPI 2021: 45 ML/MIN/1.73
EOSINOPHIL # BLD AUTO: 0.1 X10E3/UL (ref 0–0.4)
EOSINOPHIL NFR BLD AUTO: 1 %
ERYTHROCYTE [DISTWIDTH] IN BLOOD BY AUTOMATED COUNT: 12.9 % (ref 11.7–15.4)
GLOBULIN SER CALC-MCNC: 2.1 G/DL (ref 1.5–4.5)
GLUCOSE SERPL-MCNC: 232 MG/DL (ref 70–99)
HBA1C MFR BLD: 6.9 % (ref 4.8–5.6)
HCT VFR BLD AUTO: 31 % (ref 34–46.6)
HGB BLD-MCNC: 9.7 G/DL (ref 11.1–15.9)
IMM GRANULOCYTES # BLD AUTO: 0 X10E3/UL (ref 0–0.1)
IMM GRANULOCYTES NFR BLD AUTO: 0 %
LYMPHOCYTES # BLD AUTO: 1.5 X10E3/UL (ref 0.7–3.1)
LYMPHOCYTES NFR BLD AUTO: 24 %
MCH RBC QN AUTO: 28.5 PG (ref 26.6–33)
MCHC RBC AUTO-ENTMCNC: 31.3 G/DL (ref 31.5–35.7)
MCV RBC AUTO: 91 FL (ref 79–97)
MONOCYTES # BLD AUTO: 0.4 X10E3/UL (ref 0.1–0.9)
MONOCYTES NFR BLD AUTO: 7 %
NEUTROPHILS # BLD AUTO: 4.2 X10E3/UL (ref 1.4–7)
NEUTROPHILS NFR BLD AUTO: 68 %
PLATELET # BLD AUTO: 339 X10E3/UL (ref 150–450)
POTASSIUM SERPL-SCNC: 4.4 MMOL/L (ref 3.5–5.2)
PROT SERPL-MCNC: 6.2 G/DL (ref 6–8.5)
RBC # BLD AUTO: 3.4 X10E6/UL (ref 3.77–5.28)
SODIUM SERPL-SCNC: 142 MMOL/L (ref 134–144)
T4 FREE SERPL-MCNC: 1.09 NG/DL (ref 0.82–1.77)
TSH SERPL DL<=0.005 MIU/L-ACNC: 1.05 UIU/ML (ref 0.45–4.5)
WBC # BLD AUTO: 6.2 X10E3/UL (ref 3.4–10.8)

## 2023-12-19 RX ORDER — METFORMIN HYDROCHLORIDE 1000 MG/1
500 TABLET ORAL 2 TIMES DAILY WITH MEALS
Qty: 180 TABLET | Refills: 3 | Status: SHIPPED | OUTPATIENT
Start: 2023-12-19 | End: 2024-01-20 | Stop reason: SDUPTHER

## 2024-01-20 DIAGNOSIS — I10 HYPERTENSION, UNSPECIFIED TYPE: Primary | ICD-10-CM

## 2024-01-20 DIAGNOSIS — N18.31 TYPE 2 DIABETES MELLITUS WITH STAGE 3A CHRONIC KIDNEY DISEASE, WITHOUT LONG-TERM CURRENT USE OF INSULIN (CMS/HCC): ICD-10-CM

## 2024-01-20 DIAGNOSIS — E11.22 TYPE 2 DIABETES MELLITUS WITH STAGE 3A CHRONIC KIDNEY DISEASE, WITHOUT LONG-TERM CURRENT USE OF INSULIN (CMS/HCC): ICD-10-CM

## 2024-01-20 DIAGNOSIS — E78.9 LIPID DISORDER: ICD-10-CM

## 2024-01-22 RX ORDER — LISINOPRIL 10 MG/1
10 TABLET ORAL DAILY
Qty: 90 TABLET | Refills: 3 | Status: SHIPPED | OUTPATIENT
Start: 2024-01-22 | End: 2024-01-26 | Stop reason: SDUPTHER

## 2024-01-22 RX ORDER — METFORMIN HYDROCHLORIDE 1000 MG/1
1000 TABLET ORAL 2 TIMES DAILY WITH MEALS
Qty: 180 TABLET | Refills: 3 | Status: SHIPPED | OUTPATIENT
Start: 2024-01-22 | End: 2025-01-23 | Stop reason: SDUPTHER

## 2024-01-22 RX ORDER — ATORVASTATIN CALCIUM 40 MG/1
40 TABLET, FILM COATED ORAL DAILY
Qty: 90 TABLET | Refills: 3 | Status: SHIPPED | OUTPATIENT
Start: 2024-01-22

## 2024-01-22 NOTE — TELEPHONE ENCOUNTER
Medicine Refill Request    Last Office Visit: 12/11/2023   Last Consult Visit: Visit date not found  Last Telemedicine Visit: Visit date not found    Next Appointment: 6/12/2024      Current Outpatient Medications:   •  atorvastatin (LIPITOR) 40 mg tablet, , Disp: , Rfl:   •  lisinopriL (PRINIVIL) 10 mg tablet, Take 10 mg by mouth daily., Disp: , Rfl:   •  metFORMIN (GLUCOPHAGE) 1,000 mg tablet, Take 0.5 tablets (500 mg total) by mouth 2 (two) times a day with meals., Disp: 180 tablet, Rfl: 3  •  ONETOUCH DELICA PLUS LANCET 33 gauge misc, TEST EVERY DAY, Disp: , Rfl:   •  ONETOUCH ULTRA TEST strip, TEST EVERY DAY, Disp: , Rfl:       BP Readings from Last 3 Encounters:   12/11/23 120/60   11/06/23 131/67   06/11/23 132/64       Recent Lab results:  Lab Results   Component Value Date    CHOL 173 11/02/2023   ,   Lab Results   Component Value Date    HDL 50 11/02/2023   ,   Lab Results   Component Value Date    LDLCALC 96 11/02/2023   ,   Lab Results   Component Value Date    TRIG 153 (H) 11/02/2023        Lab Results   Component Value Date    GLUCOSE 232 (H) 12/18/2023   ,   Lab Results   Component Value Date    HGBA1C 6.9 (H) 12/18/2023         Lab Results   Component Value Date    CREATININE 1.29 (H) 12/18/2023       Lab Results   Component Value Date    TSH 1.050 12/18/2023           Lab Results   Component Value Date    HGBA1C 6.9 (H) 12/18/2023

## 2024-01-26 DIAGNOSIS — I10 HYPERTENSION, UNSPECIFIED TYPE: ICD-10-CM

## 2024-01-26 RX ORDER — LISINOPRIL 10 MG/1
10 TABLET ORAL DAILY
Qty: 90 TABLET | Refills: 3 | Status: SHIPPED | OUTPATIENT
Start: 2024-01-26 | End: 2024-04-01

## 2024-04-01 ENCOUNTER — OFFICE VISIT (OUTPATIENT)
Dept: FAMILY MEDICINE | Facility: CLINIC | Age: 69
End: 2024-04-01
Payer: COMMERCIAL

## 2024-04-01 VITALS
HEART RATE: 74 BPM | OXYGEN SATURATION: 98 % | DIASTOLIC BLOOD PRESSURE: 68 MMHG | WEIGHT: 115 LBS | SYSTOLIC BLOOD PRESSURE: 110 MMHG | RESPIRATION RATE: 16 BRPM | TEMPERATURE: 97.6 F | BODY MASS INDEX: 21.16 KG/M2 | HEIGHT: 62 IN

## 2024-04-01 DIAGNOSIS — E11.22 TYPE 2 DIABETES MELLITUS WITH STAGE 3A CHRONIC KIDNEY DISEASE, WITHOUT LONG-TERM CURRENT USE OF INSULIN (CMS/HCC): ICD-10-CM

## 2024-04-01 DIAGNOSIS — I10 HYPERTENSION, UNSPECIFIED TYPE: ICD-10-CM

## 2024-04-01 DIAGNOSIS — N18.31 STAGE 3A CHRONIC KIDNEY DISEASE (CMS/HCC): ICD-10-CM

## 2024-04-01 DIAGNOSIS — D64.9 ANEMIA, UNSPECIFIED TYPE: Primary | ICD-10-CM

## 2024-04-01 DIAGNOSIS — N18.31 TYPE 2 DIABETES MELLITUS WITH STAGE 3A CHRONIC KIDNEY DISEASE, WITHOUT LONG-TERM CURRENT USE OF INSULIN (CMS/HCC): ICD-10-CM

## 2024-04-01 DIAGNOSIS — R53.83 OTHER FATIGUE: ICD-10-CM

## 2024-04-01 PROBLEM — J32.2 CHRONIC ETHMOIDAL SINUSITIS: Status: ACTIVE | Noted: 2023-12-18

## 2024-04-01 PROCEDURE — 3008F BODY MASS INDEX DOCD: CPT | Performed by: FAMILY MEDICINE

## 2024-04-01 PROCEDURE — 3078F DIAST BP <80 MM HG: CPT | Performed by: FAMILY MEDICINE

## 2024-04-01 PROCEDURE — 3074F SYST BP LT 130 MM HG: CPT | Performed by: FAMILY MEDICINE

## 2024-04-01 PROCEDURE — 99214 OFFICE O/P EST MOD 30 MIN: CPT | Performed by: FAMILY MEDICINE

## 2024-04-01 RX ORDER — GLUCOSAM/CHONDRO/HERB 149/HYAL 750-100 MG
TABLET ORAL
COMMUNITY

## 2024-04-01 RX ORDER — LISINOPRIL 5 MG/1
5 TABLET ORAL DAILY
Qty: 90 TABLET | Refills: 1 | Status: SHIPPED | OUTPATIENT
Start: 2024-04-01 | End: 2024-08-26

## 2024-04-01 RX ORDER — LEVOFLOXACIN 750 MG/1
750 TABLET ORAL DAILY
COMMUNITY
Start: 2024-03-26 | End: 2024-08-13

## 2024-04-01 RX ORDER — BUDESONIDE 0.5 MG/2ML
0.5 INHALANT ORAL
COMMUNITY
Start: 2024-03-25 | End: 2024-08-13

## 2024-04-01 ASSESSMENT — PATIENT HEALTH QUESTIONNAIRE - PHQ9: SUM OF ALL RESPONSES TO PHQ9 QUESTIONS 1 & 2: 0

## 2024-04-01 NOTE — ASSESSMENT & PLAN NOTE
Blood pressure is at the low end of normal on lisinopril 10 mg daily and given her complaints of fatigue we will decrease to 5 mg daily.

## 2024-04-01 NOTE — ASSESSMENT & PLAN NOTE
"Fatigue with weight loss, early satiety, decreased appetite and \"feeling weak\".  Blood work is ordered.  Lisinopril dose is adjusted.  Frequent small meals are recommended and if blood work is nonrevealing and she is not feeling any better in the next couple of weeks we will extend our workup.  "

## 2024-04-01 NOTE — PROGRESS NOTES
"    Subjective      Patient ID: Talita Jacques is a 68 y.o. female.  1955      This 68-year-old  female usually followed by Dr. Funes presents complaining of feeling poorly for the past 11 days.  She had sinus surgery in January and after couple of weeks started feeling fairly well until Thursday, March 21 when she developed what she thought was a minor cold that she got from her granddaughter.  She had slight nasal congestion, sore throat and cough but no fever, chills, shortness of breath, wheezing.  Those symptoms lasted a few days and resolved but she has had persistent fatigue, loss of appetite, early satiety.  She has had no vomiting but has had some nausea.  She has had no diarrhea or constipation.  There is no blood in her urine or her stool.    Past medical history significant for type 2 diabetes mellitus, osteoporosis, chronic kidney disease stage III and multiple skin cancers.    She is  and quit smoking years ago.        The following have been reviewed and updated as appropriate in this visit:   Tobacco  Allergies  Meds  Problems  Med Hx  Surg Hx  Fam Hx       Review of Systems    Objective     Vitals:    04/01/24 1419   BP: 110/68   BP Location: Left upper arm   Patient Position: Sitting   Pulse: 74   Resp: 16   Temp: 36.4 °C (97.6 °F)   TempSrc: Oral   SpO2: 98%   Weight: 52.2 kg (115 lb)   Height: 1.575 m (5' 2\")     Body mass index is 21.03 kg/m².    Physical Exam  Vitals reviewed.   Constitutional:       General: She is not in acute distress.     Appearance: She is normal weight. She is not ill-appearing.      Comments: She was 14 pounds less than she did 3 and half months ago and 10 pounds less than she did 5 months ago.   Cardiovascular:      Rate and Rhythm: Normal rate and regular rhythm.      Heart sounds: Normal heart sounds.   Pulmonary:      Effort: Pulmonary effort is normal.   Abdominal:      General: Abdomen is flat. Bowel sounds are normal. There is no " distension.      Palpations: Abdomen is soft. There is no mass.      Tenderness: There is no abdominal tenderness.   Musculoskeletal:         General: No deformity.      Cervical back: Neck supple.      Right lower leg: No edema.      Left lower leg: No edema.   Lymphadenopathy:      Cervical: No cervical adenopathy.   Skin:     General: Skin is warm and dry.   Neurological:      General: No focal deficit present.      Mental Status: She is alert.   Psychiatric:         Mood and Affect: Mood normal.       Lab Results   Component Value Date    WBC 6.2 12/18/2023    HGB 9.7 (L) 12/18/2023    HCT 31.0 (L) 12/18/2023     12/18/2023    CHOL 173 11/02/2023    TRIG 153 (H) 11/02/2023    HDL 50 11/02/2023    ALT 21 12/18/2023    AST 20 12/18/2023     12/18/2023    K 4.4 12/18/2023     12/18/2023    CREATININE 1.29 (H) 12/18/2023    BUN 24 12/18/2023    CO2 23 12/18/2023    TSH 1.050 12/18/2023    HGBA1C 6.9 (H) 12/18/2023    MICROALBUR 18.9 12/11/2023        Assessment/Plan   Diagnoses and all orders for this visit:    Anemia, unspecified type (Primary)  Assessment & Plan:  Normocytic anemia on blood work done December 18, 2023 likely due to chronic kidney disease but repeat blood work is ordered    Orders:  -     CBC and differential; Future  -     Iron and TIBC; Future  -     Ferritin; Future  -     Vitamin B12; Future  -     Folate; Future  -     TSH w reflex FT4; Future    Type 2 diabetes mellitus with stage 3a chronic kidney disease, without long-term current use of insulin (CMS/[facility])  -     Comprehensive metabolic panel; Future  -     Hemoglobin A1c; Future    Stage 3a chronic kidney disease (CMS/[facility])  Assessment & Plan:  Will work on controlling blood sugar and blood pressure is normal.  She avoids NSAIDs. Repeat renal function is ordered.    Orders:  -     lisinopriL (PRINIVIL) 5 mg tablet; Take 1 tablet (5 mg total) by mouth daily.    Hypertension, unspecified type  Assessment & Plan:  Blood  "pressure is at the low end of normal on lisinopril 10 mg daily and given her complaints of fatigue we will decrease to 5 mg daily.      Other fatigue  Assessment & Plan:  Fatigue with weight loss, early satiety, decreased appetite and \"feeling weak\".  Blood work is ordered.  Lisinopril dose is adjusted.  Frequent small meals are recommended and if blood work is nonrevealing and she is not feeling any better in the next couple of weeks we will extend our workup.            "

## 2024-04-01 NOTE — ASSESSMENT & PLAN NOTE
Normocytic anemia on blood work done December 18, 2023 likely due to chronic kidney disease but repeat blood work is ordered   Per Dr Kc's LOS  FU Dr Bansal 2 months -- Scheduled on 11/3/23 for MD at 1400.

## 2024-04-01 NOTE — ASSESSMENT & PLAN NOTE
Will work on controlling blood sugar and blood pressure is normal.  She avoids NSAIDs. Repeat renal function is ordered.

## 2024-04-03 ENCOUNTER — TELEPHONE (OUTPATIENT)
Dept: FAMILY MEDICINE | Facility: CLINIC | Age: 69
End: 2024-04-03
Payer: COMMERCIAL

## 2024-04-03 DIAGNOSIS — E83.52 HYPERCALCEMIA: Primary | ICD-10-CM

## 2024-04-03 LAB
ALBUMIN SERPL-MCNC: 4.3 G/DL (ref 3.9–4.9)
ALBUMIN/GLOB SERPL: 2.2 {RATIO} (ref 1.2–2.2)
ALP SERPL-CCNC: 56 IU/L (ref 44–121)
ALT SERPL-CCNC: 16 IU/L (ref 0–32)
AST SERPL-CCNC: 24 IU/L (ref 0–40)
BASOPHILS # BLD AUTO: 0 X10E3/UL (ref 0–0.2)
BASOPHILS NFR BLD AUTO: 0 %
BILIRUB SERPL-MCNC: 0.2 MG/DL (ref 0–1.2)
BUN SERPL-MCNC: 21 MG/DL (ref 8–27)
BUN/CREAT SERPL: 13 (ref 12–28)
CALCIUM SERPL-MCNC: 11.6 MG/DL (ref 8.7–10.3)
CHLORIDE SERPL-SCNC: 96 MMOL/L (ref 96–106)
CO2 SERPL-SCNC: 24 MMOL/L (ref 20–29)
CREAT SERPL-MCNC: 1.63 MG/DL (ref 0.57–1)
EGFRCR SERPLBLD CKD-EPI 2021: 34 ML/MIN/1.73
EOSINOPHIL # BLD AUTO: 0.1 X10E3/UL (ref 0–0.4)
EOSINOPHIL NFR BLD AUTO: 1 %
ERYTHROCYTE [DISTWIDTH] IN BLOOD BY AUTOMATED COUNT: 13 % (ref 11.7–15.4)
FERRITIN SERPL-MCNC: 26 NG/ML (ref 15–150)
FOLATE SERPL-MCNC: >20 NG/ML
GLOBULIN SER CALC-MCNC: 2 G/DL (ref 1.5–4.5)
GLUCOSE SERPL-MCNC: 148 MG/DL (ref 70–99)
HBA1C MFR BLD: 6.4 % (ref 4.8–5.6)
HCT VFR BLD AUTO: 31.9 % (ref 34–46.6)
HGB BLD-MCNC: 10.2 G/DL (ref 11.1–15.9)
IMM GRANULOCYTES # BLD AUTO: 0 X10E3/UL (ref 0–0.1)
IMM GRANULOCYTES NFR BLD AUTO: 1 %
IRON SATN MFR SERPL: 17 % (ref 15–55)
IRON SERPL-MCNC: 53 UG/DL (ref 27–139)
LYMPHOCYTES # BLD AUTO: 1.7 X10E3/UL (ref 0.7–3.1)
LYMPHOCYTES NFR BLD AUTO: 20 %
MCH RBC QN AUTO: 28.2 PG (ref 26.6–33)
MCHC RBC AUTO-ENTMCNC: 32 G/DL (ref 31.5–35.7)
MCV RBC AUTO: 88 FL (ref 79–97)
MONOCYTES # BLD AUTO: 0.8 X10E3/UL (ref 0.1–0.9)
MONOCYTES NFR BLD AUTO: 9 %
NEUTROPHILS # BLD AUTO: 6 X10E3/UL (ref 1.4–7)
NEUTROPHILS NFR BLD AUTO: 69 %
PLATELET # BLD AUTO: 296 X10E3/UL (ref 150–450)
POTASSIUM SERPL-SCNC: 4.3 MMOL/L (ref 3.5–5.2)
PROT SERPL-MCNC: 6.3 G/DL (ref 6–8.5)
RBC # BLD AUTO: 3.62 X10E6/UL (ref 3.77–5.28)
SODIUM SERPL-SCNC: 140 MMOL/L (ref 134–144)
T4 FREE SERPL-MCNC: 1.57 NG/DL (ref 0.82–1.77)
TIBC SERPL-MCNC: 317 UG/DL (ref 250–450)
TSH SERPL DL<=0.005 MIU/L-ACNC: 0.84 UIU/ML (ref 0.45–4.5)
UIBC SERPL-MCNC: 264 UG/DL (ref 118–369)
VIT B12 SERPL-MCNC: 441 PG/ML (ref 232–1245)
WBC # BLD AUTO: 8.5 X10E3/UL (ref 3.4–10.8)

## 2024-04-05 LAB — PTH-INTACT SERPL-MCNC: 15 PG/ML (ref 15–65)

## 2024-04-10 ENCOUNTER — TELEPHONE (OUTPATIENT)
Dept: FAMILY MEDICINE | Facility: CLINIC | Age: 69
End: 2024-04-10
Payer: COMMERCIAL

## 2024-04-10 NOTE — TELEPHONE ENCOUNTER
Spoke with patient.  Her questions were regarding blood work results.  Her anemia has slightly improved from previous, iron studies normal.  There are no explanations for her fatigue.  I advised her to stop taking her calcium supplement as she reports she takes 2 supplements a day.  We discussed sleep hygiene.  She also drinks 4 caffeinated beverages a day.  3 coffees, 1 diet soda.  I advised her to try to limit this to 1-2 max and not to drink any caffeine after 12:00 noon.

## 2024-04-10 NOTE — TELEPHONE ENCOUNTER
Regarding: FW: Test results   Contact: [phone]    ----- Message -----  From: Laura Amaya MA  Sent: 4/10/2024   9:09 AM EDT  To: Wendy Tobar MD  Subject: Test results                                     ----- Message from Laura Amaya MA sent at 4/10/2024  9:09 AM EDT -----  Can you please advise patient on lab results, let me know if anything additional is needed.     ----- Message from Talita Jacques to Noa Funes MD sent at 4/10/2024  9:06 AM -----   Can update me on my latest test results and  what is the plan going forward. Thank you

## 2024-04-16 DIAGNOSIS — N18.31 TYPE 2 DIABETES MELLITUS WITH STAGE 3A CHRONIC KIDNEY DISEASE, WITHOUT LONG-TERM CURRENT USE OF INSULIN (CMS/HCC): Primary | ICD-10-CM

## 2024-04-16 DIAGNOSIS — E11.22 TYPE 2 DIABETES MELLITUS WITH STAGE 3A CHRONIC KIDNEY DISEASE, WITHOUT LONG-TERM CURRENT USE OF INSULIN (CMS/HCC): Primary | ICD-10-CM

## 2024-04-17 RX ORDER — INSULIN PUMP SYRINGE, 3 ML
1 EACH MISCELLANEOUS DAILY
Qty: 1 EACH | Refills: 0 | Status: SHIPPED | OUTPATIENT
Start: 2024-04-17 | End: 2025-01-21

## 2024-04-17 NOTE — TELEPHONE ENCOUNTER
From: Talita Jacques  To: Office of Noa Funes  Sent: 4/16/2024 10:47 PM EDT  Subject: Medication Renewal Request    Refills have been requested for the following medications:   Other - One touch meter kit     Preferred pharmacy: [id number] - [place] - [address]  Delivery method: Pickup

## 2024-04-17 NOTE — TELEPHONE ENCOUNTER
Medicine Refill Request    Last Office Visit: 4/1/2024   Last Consult Visit: Visit date not found  Last Telemedicine Visit: Visit date not found    Next Appointment: 6/12/2024      Current Outpatient Medications:     atorvastatin (LIPITOR) 40 mg tablet, Take 1 tablet (40 mg total) by mouth daily., Disp: 90 tablet, Rfl: 3    budesonide (PULMICORT) 0.5 mg/2 mL nebulizer solution, Take 0.5 mg by nebulization 2 (two) times a day., Disp: , Rfl:     levoFLOXacin (LEVAQUIN) 750 mg tablet, Take 750 mg by mouth daily., Disp: , Rfl:     lisinopriL (PRINIVIL) 5 mg tablet, Take 1 tablet (5 mg total) by mouth daily., Disp: 90 tablet, Rfl: 1    metFORMIN (GLUCOPHAGE) 1,000 mg tablet, Take 1 tablet (1,000 mg total) by mouth 2 (two) times a day with meals., Disp: 180 tablet, Rfl: 3    omega 3-dha-epa-fish oil (Fish OiL) 1,000 mg (120 mg-180 mg) capsule, Take by mouth., Disp: , Rfl:     ONETOUCH DELICA PLUS LANCET 33 gauge misc, TEST EVERY DAY, Disp: , Rfl:     ONETOUCH ULTRA TEST strip, TEST EVERY DAY, Disp: , Rfl:       BP Readings from Last 3 Encounters:   04/01/24 110/68   12/11/23 120/60   11/06/23 131/67       Recent Lab results:  Lab Results   Component Value Date    CHOL 173 11/02/2023   ,   Lab Results   Component Value Date    HDL 50 11/02/2023   ,   Lab Results   Component Value Date    LDLCALC 96 11/02/2023   ,   Lab Results   Component Value Date    TRIG 153 (H) 11/02/2023        Lab Results   Component Value Date    GLUCOSE 148 (H) 04/02/2024   ,   Lab Results   Component Value Date    HGBA1C 6.4 (H) 04/02/2024         Lab Results   Component Value Date    CREATININE 1.63 (H) 04/02/2024       Lab Results   Component Value Date    TSH 0.836 04/02/2024           Lab Results   Component Value Date    HGBA1C 6.4 (H) 04/02/2024

## 2024-08-13 ENCOUNTER — OFFICE VISIT (OUTPATIENT)
Dept: FAMILY MEDICINE | Facility: CLINIC | Age: 69
End: 2024-08-13
Payer: COMMERCIAL

## 2024-08-13 VITALS
HEART RATE: 62 BPM | SYSTOLIC BLOOD PRESSURE: 120 MMHG | WEIGHT: 115 LBS | BODY MASS INDEX: 21.16 KG/M2 | OXYGEN SATURATION: 99 % | DIASTOLIC BLOOD PRESSURE: 60 MMHG | TEMPERATURE: 98.4 F | RESPIRATION RATE: 16 BRPM | HEIGHT: 62 IN

## 2024-08-13 DIAGNOSIS — H91.93 BILATERAL HEARING LOSS, UNSPECIFIED HEARING LOSS TYPE: ICD-10-CM

## 2024-08-13 DIAGNOSIS — Z12.31 SCREENING MAMMOGRAM FOR BREAST CANCER: ICD-10-CM

## 2024-08-13 DIAGNOSIS — E83.52 HYPERCALCEMIA: ICD-10-CM

## 2024-08-13 DIAGNOSIS — Z11.59 SCREENING FOR VIRAL DISEASE: ICD-10-CM

## 2024-08-13 DIAGNOSIS — J32.2 CHRONIC ETHMOIDAL SINUSITIS: ICD-10-CM

## 2024-08-13 DIAGNOSIS — N18.32 STAGE 3B CHRONIC KIDNEY DISEASE (CMS/HCC): ICD-10-CM

## 2024-08-13 DIAGNOSIS — M81.0 AGE-RELATED OSTEOPOROSIS WITHOUT CURRENT PATHOLOGICAL FRACTURE: ICD-10-CM

## 2024-08-13 DIAGNOSIS — Z00.00 MEDICARE ANNUAL WELLNESS VISIT, SUBSEQUENT: Primary | ICD-10-CM

## 2024-08-13 DIAGNOSIS — I10 PRIMARY HYPERTENSION: ICD-10-CM

## 2024-08-13 DIAGNOSIS — E11.22 TYPE 2 DIABETES MELLITUS WITH STAGE 3B CHRONIC KIDNEY DISEASE, WITHOUT LONG-TERM CURRENT USE OF INSULIN (CMS/HCC): ICD-10-CM

## 2024-08-13 DIAGNOSIS — N18.32 TYPE 2 DIABETES MELLITUS WITH STAGE 3B CHRONIC KIDNEY DISEASE, WITHOUT LONG-TERM CURRENT USE OF INSULIN (CMS/HCC): ICD-10-CM

## 2024-08-13 DIAGNOSIS — D64.9 ANEMIA, UNSPECIFIED TYPE: ICD-10-CM

## 2024-08-13 PROBLEM — R53.83 OTHER FATIGUE: Status: RESOLVED | Noted: 2024-04-01 | Resolved: 2024-08-13

## 2024-08-13 PROCEDURE — G0439 PPPS, SUBSEQ VISIT: HCPCS | Performed by: FAMILY MEDICINE

## 2024-08-13 PROCEDURE — 3008F BODY MASS INDEX DOCD: CPT | Performed by: FAMILY MEDICINE

## 2024-08-13 PROCEDURE — 99214 OFFICE O/P EST MOD 30 MIN: CPT | Mod: 25 | Performed by: FAMILY MEDICINE

## 2024-08-13 PROCEDURE — 3078F DIAST BP <80 MM HG: CPT | Performed by: FAMILY MEDICINE

## 2024-08-13 PROCEDURE — 3074F SYST BP LT 130 MM HG: CPT | Performed by: FAMILY MEDICINE

## 2024-08-13 RX ORDER — MUPIROCIN 20 MG/G
1 OINTMENT TOPICAL 2 TIMES DAILY
COMMUNITY
Start: 2024-02-26 | End: 2025-01-21

## 2024-08-13 RX ORDER — DENOSUMAB 60 MG/ML
60 INJECTION SUBCUTANEOUS ONCE
Start: 2024-08-13 | End: 2024-08-13

## 2024-08-13 ASSESSMENT — PATIENT HEALTH QUESTIONNAIRE - PHQ9: SUM OF ALL RESPONSES TO PHQ9 QUESTIONS 1 & 2: 0

## 2024-08-13 ASSESSMENT — ENCOUNTER SYMPTOMS
CONSTITUTIONAL NEGATIVE: 1
SINUS PRESSURE: 1
TROUBLE SWALLOWING: 0
EYES NEGATIVE: 1
PSYCHIATRIC NEGATIVE: 1
RESPIRATORY NEGATIVE: 1
CARDIOVASCULAR NEGATIVE: 1
VOICE CHANGE: 0
GASTROINTESTINAL NEGATIVE: 1
NEUROLOGICAL NEGATIVE: 1

## 2024-08-13 ASSESSMENT — MINI COG
TOTAL SCORE: 3
COMPLETED: YES

## 2024-08-13 NOTE — ASSESSMENT & PLAN NOTE
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  Diabetic foot exam today is normal.  She states she had a diabetic eye exam done 2 months ago with Dr. Ledesma, we will request those records.

## 2024-08-13 NOTE — ASSESSMENT & PLAN NOTE
She lives a healthy, low risk lifestyle.  She has a living well and her  is her proxy.  I recommended flu and COVID vaccines in the fall.  She states she has had Shingrix vaccine at her pharmacy and we will call for that information.  She had a mammogram in June 2023 and 1 is ordered.  She had a colonoscopy 2 years ago with Dr. Connors and repeat in 5 years was recommended.

## 2024-08-13 NOTE — ASSESSMENT & PLAN NOTE
She had significantly elevated calcium of 11.6 on blood work done April 2, 2024 but I wonder whether that was a lab error as her PTH was normal and repeat calcium May 29, 2024 was normal.  Will continue to monitor.

## 2024-08-13 NOTE — ASSESSMENT & PLAN NOTE
She states that she has never been told she has chronic kidney disease but blood work and her records indicate decreased renal function with GFR in April at 34 but improved closer to her baseline of 42 on blood work done May 29, 2024 by [facility].  Will continue to monitor and refer to nephrology if worsening.

## 2024-08-13 NOTE — PROGRESS NOTES
"    Subjective      Patient ID: Talita Jacques is a 69 y.o. female.  1955      This 69-year-old  female presents 4 and half months since I met her for her Medicare annual wellness visit and follow-up on her chronic medical issues.  She continues to work with ENT on her chronic sinusitis for which she is currently on Bactrim and mupirocin.  She is no longer using Pulmicort rinses.    Her past medical, surgical and social histories were reviewed.  She quit smoking more than 30 years ago after intermittent social tobacco use.  She denies alcohol use.        The following have been reviewed and updated as appropriate in this visit:   Tobacco  Allergies  Meds  Problems  Med Hx  Surg Hx  Fam Hx       Review of Systems   Constitutional: Negative.    HENT:  Positive for hearing loss and sinus pressure. Negative for trouble swallowing and voice change.    Eyes: Negative.    Respiratory: Negative.     Cardiovascular: Negative.    Gastrointestinal: Negative.    Genitourinary: Negative.    Neurological: Negative.    Psychiatric/Behavioral: Negative.         Objective     Vitals:    08/13/24 1520   BP: 120/60   Pulse: 62   Resp: 16   Temp: 36.9 °C (98.4 °F)   SpO2: 99%   Weight: 52.2 kg (115 lb)   Height: 1.575 m (5' 2\")     Body mass index is 21.03 kg/m².    Physical Exam  Vitals reviewed.   Constitutional:       General: She is not in acute distress.     Appearance: She is normal weight. She is not ill-appearing.      Comments: Her weight is stable   HENT:      Ears:      Comments: Bilateral hearing aids  Cardiovascular:      Rate and Rhythm: Normal rate and regular rhythm.      Pulses: Normal pulses.           Dorsalis pedis pulses are 2+ on the right side and 2+ on the left side.        Posterior tibial pulses are 2+ on the right side and 2+ on the left side.      Heart sounds: Normal heart sounds.   Pulmonary:      Effort: Pulmonary effort is normal.      Breath sounds: Normal breath sounds. "   Musculoskeletal:      Right lower leg: No edema.      Left lower leg: No edema.   Feet:      Right foot:      Protective Sensation: 5 sites tested.  5 sites sensed.      Skin integrity: Skin integrity normal.      Toenail Condition: Right toenails are normal.      Left foot:      Protective Sensation: 5 sites tested.  5 sites sensed.      Skin integrity: Skin integrity normal.      Toenail Condition: Left toenails are normal.   Skin:     General: Skin is warm and dry.   Neurological:      Mental Status: She is alert.   Psychiatric:         Mood and Affect: Mood normal.       Lab Results   Component Value Date    WBC 8.5 04/02/2024    HGB 10.2 (L) 04/02/2024    HCT 31.9 (L) 04/02/2024     04/02/2024    CHOL 173 11/02/2023    TRIG 153 (H) 11/02/2023    HDL 50 11/02/2023    ALT 16 04/02/2024    AST 24 04/02/2024     04/02/2024    K 4.3 04/02/2024    CL 96 04/02/2024    CREATININE 1.63 (H) 04/02/2024    BUN 21 04/02/2024    CO2 24 04/02/2024    TSH 0.836 04/02/2024    HGBA1C 6.4 (H) 04/02/2024    MICROALBUR 18.9 12/11/2023        Assessment/Plan   Diagnoses and all orders for this visit:    Medicare annual wellness visit, subsequent (Primary)  Assessment & Plan:  She lives a healthy, low risk lifestyle.  She has a living well and her  is her proxy.  I recommended flu and COVID vaccines in the fall.  She states she has had Shingrix vaccine at her pharmacy and we will call for that information.  She had a mammogram in June 2023 and 1 is ordered.  She had a colonoscopy 2 years ago with Dr. Connors and repeat in 5 years was recommended.      Age-related osteoporosis without current pathological fracture  Assessment & Plan:  Managed by Tabatha Henderson, sports medicine with [facility] on Prolia every 6 months.  She does not take any calcium or vitamin D supplementation and vitamin D level was normal May 29, 2024.  She states her last DEXA scan was done a year ago and we will request those  medical records from [facility].    Orders:  -     denosumab (PROLIA) 60 mg/mL syringe; Inject 1 mL (60 mg total) under the skin once for 1 dose.  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  Diabetic foot exam today is normal.  She states she had a diabetic eye exam done 2 months ago with Dr. Ledesma, we will request those records.    Orders:  -     CBC and differential; Future  -     Comprehensive metabolic panel; Future  -     Hemoglobin A1c; Future  -     Lipid panel; Future  -     Microalbumin / creatinine urine ratio; Future    Stage 3b chronic kidney disease (CMS/[facility])  Assessment & Plan:  She states that she has never been told she has chronic kidney disease but blood work and her records indicate decreased renal function with GFR in April at 34 but improved closer to her baseline of 42 on blood work done May 29, 2024 by [facility].  Will continue to monitor and refer to nephrology if worsening.      Primary hypertension  Assessment & Plan:  Blood pressure remains well-controlled and she is less fatigued since decreasing her lisinopril from 10 mg to 5 mg daily which she will continue.      Anemia, unspecified type  Assessment & Plan:  She has normocytic anemia with hemoglobin at 10 which she states is her baseline.  States she has had this for years, it has been stable and has been evaluated by hematology which told her that this was normal for her.  I suspect she probably has a component of anemia of chronic kidney disease and will continue to monitor  her blood count.      Chronic ethmoidal sinusitis  Assessment & Plan:  Managed by ENT, ongoing despite surgery in January, currently on Bactrim and mupirocin ointment.      Bilateral hearing loss, unspecified hearing loss type  Assessment & Plan:  Struggles even with hearing aids but states her hearing is stable.      Hypercalcemia  Assessment & Plan:  She had significantly elevated calcium of 11.6 on blood work done April 2, 2024 but I wonder whether that was a lab error as her PTH was normal and repeat calcium May 29, 2024 was normal.  Will continue to monitor.          Subjective     Talita Jacques is a 69 y.o. female who presents for a subsequent annual wellness visit.     Patient Care Team:  Wendy Tobar MD as PCP - General (Family Medicine)  Bernnan Ardon III, MD as Cardiologist (Interventional Cardiology)  Manda Henderson DO as Consulting Physician (Family Medicine)  Fly Ledesma MD as Consulting Physician (Ophthalmology)  Kevan Connors MD as Consulting Physician (Gastroenterology)    Comprehensive Medical and Social History  Patient Active Problem List   Diagnosis    Age-related osteoporosis without current pathological fracture    Stage 3b chronic kidney disease (CMS/[facility])    Hypercalcemia    Type 2 diabetes mellitus with stage 3b chronic kidney disease, without long-term current use of insulin (CMS/[facility])    Chronic ethmoidal sinusitis    Anemia, unspecified    Hypertension    Medicare annual wellness visit, subsequent    Bilateral hearing loss     Past Medical History:   Diagnosis Date    Hypertension     Lipid disorder     Type 2 diabetes mellitus (CMS/[facility])      Past Surgical History:   Procedure Laterality Date    SINUS SURGERY      1/2024    WRIST FRACTURE SURGERY Right      Allergies   Allergen Reactions    Amoxicillin Rash     Current Outpatient Medications   Medication Sig Dispense Refill    atorvastatin (LIPITOR) 40 mg tablet Take 1 tablet (40 mg total) by mouth daily.  "90 tablet 3    blood-glucose meter kit 1 each daily. To monitor blood glucose 1 each 0    denosumab (PROLIA) 60 mg/mL syringe Inject 1 mL (60 mg total) under the skin once for 1 dose.      lisinopriL (PRINIVIL) 5 mg tablet Take 1 tablet (5 mg total) by mouth daily. 90 tablet 1    metFORMIN (GLUCOPHAGE) 1,000 mg tablet Take 1 tablet (1,000 mg total) by mouth 2 (two) times a day with meals. 180 tablet 3    mupirocin (BACTROBAN) 2 % ointment Apply 1 Application topically 2 (two) times a day.      omega 3-dha-epa-fish oil (Fish OiL) 1,000 mg (120 mg-180 mg) capsule Take by mouth.      ONETOUCH DELICA PLUS LANCET 33 gauge misc TEST EVERY DAY      ONETOUCH ULTRA TEST strip TEST EVERY DAY       No current facility-administered medications for this visit.     Social History     Tobacco Use    Smoking status: Former     Types: Cigarettes    Smokeless tobacco: Never    Tobacco comments:     Less then 10 pack years, quit more than 30 years ago   Vaping Use    Vaping Use: Never used   Substance Use Topics    Alcohol use: Not Currently    Drug use: Never     Family History   Problem Relation Age of Onset    Colon cancer Biological Father          at 54 y/o       Objective   Vitals  Vitals:    24 1520   BP: 120/60   Pulse: 62   Resp: 16   Temp: 36.9 °C (98.4 °F)   SpO2: 99%   Weight: 52.2 kg (115 lb)   Height: 1.575 m (5' 2\")     Body mass index is 21.03 kg/m².    Advanced Care Plan  Does patient have advance directive?: Yes           Does patient have current OOH DNR form?: Yes           Does patient have current POLST?: No             PHQ  Will the patient answer the depression questions?: Yes   Little interest or pleasure in doing things: Not at all   Feeling down, depressed, or hopeless: Not at all   Depression Risk: 0                                             Mini Cog  Completed: Yes  Score: 3  Result: Positive      Get Up and Go  Result: Pass    STEADI Falls Risk  One or more falls in the last year: No         "   Has trouble stepping up onto a curb: No   Advised to use a cane or walker to get around safely: No   Often has to rush to the toilet: No   Feels unsteady when walking: No   Has lost some feeling in feet: No   Often feels sad or depressed: No   Steadies self on furniture while walking at home: No   Takes medication that makes him/her feel lightheaded or more tired than usual: No       Takes medicine to sleep or improve mood: No   Needs to push with hands when rising from a chair: No   Falls screen completed: Yes     Hearing and Vision Screening  No results found.  See HRA for relevant hearing screening response.    Diet and Exercise  Do you exercise regularly?: Yes   Do you feel that your diet is healthy?: Yes     Assessment/Plan   Diagnoses and all orders for this visit:    Medicare annual wellness visit, subsequent (Primary)  Assessment & Plan:  She lives a healthy, low risk lifestyle.  She has a living well and her  is her proxy.  I recommended flu and COVID vaccines in the fall.  She states she has had Shingrix vaccine at her pharmacy and we will call for that information.  She had a mammogram in June 2023 and 1 is ordered.  She had a colonoscopy 2 years ago with Dr. Connors and repeat in 5 years was recommended.      Age-related osteoporosis without current pathological fracture  Assessment & Plan:  Managed by Tabatha Henderson, sports medicine with [facility] on Prolia every 6 months.  She does not take any calcium or vitamin D supplementation and vitamin D level was normal May 29, 2024.  She states her last DEXA scan was done a year ago and we will request those medical records from [facility].    Orders:  -     denosumab (PROLIA) 60 mg/mL syringe; Inject 1 mL (60 mg total) under the skin once for 1 dose.  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  Diabetic foot exam today is normal.  She states she had a diabetic eye exam done 2 months ago with Dr. Ledesma, we will request those records.    Orders:  -     CBC and differential; Future  -     Comprehensive metabolic panel; Future  -     Hemoglobin A1c; Future  -     Lipid panel; Future  -     Microalbumin / creatinine urine ratio; Future    Stage 3b chronic kidney disease (CMS/[facility])  Assessment & Plan:  She states that she has never been told she has chronic kidney disease but blood work and her records indicate decreased renal function with GFR in April at 34 but improved closer to her baseline of 42 on blood work done May 29, 2024 by [facility].  Will continue to monitor and refer to nephrology if worsening.      Primary hypertension  Assessment & Plan:  Blood pressure remains well-controlled and she is less fatigued since decreasing her lisinopril from 10 mg to 5 mg daily which she will continue.      Anemia, unspecified type  Assessment & Plan:  She has normocytic anemia with hemoglobin at 10 which she states is her baseline.  States she has had this for years, it has been stable and has been evaluated by hematology which told her that this was normal for her.  I suspect she probably has a component of anemia of chronic kidney disease and will continue to monitor her blood count.      Chronic ethmoidal sinusitis  Assessment & Plan:  Managed by ENT, ongoing despite surgery in January, currently on Bactrim and mupirocin ointment.      Bilateral hearing loss, unspecified hearing loss type  Assessment & Plan:  Struggles even with hearing aids but states her hearing is  stable.      Hypercalcemia  Assessment & Plan:  She had significantly elevated calcium of 11.6 on blood work done April 2, 2024 but I wonder whether that was a lab error as her PTH was normal and repeat calcium May 29, 2024 was normal.  Will continue to monitor.          See Patient Instructions (the written plan) which was given to the patient for PPPS and health risk factors with interventions.

## 2024-08-13 NOTE — Clinical Note
I need the following information on this pt: - Mammo done at [facility] 2023 - DEXA done at [facility] 2023 - Colonoscopy done by Dr. Connors about 2 years ago. - Dates of Shingrix vaccine done at [facility] on [address] in [place]. - Medical records from Georgette Henderson with [facility] - Medical records from her previous PCP with [facility]

## 2024-08-13 NOTE — ASSESSMENT & PLAN NOTE
Blood pressure remains well-controlled and she is less fatigued since decreasing her lisinopril from 10 mg to 5 mg daily which she will continue.

## 2024-08-13 NOTE — PATIENT INSTRUCTIONS
Your Personalized Prevention Plan Services (PPPS)    Preventive Services Checklist (Assumes Average Risk Unless Otherwise Noted):    Health Maintenance Topics with due status: Overdue       Topic Date Due    Advance Care Planning Never done    DEXA Scan Never done    Colorectal Cancer Screening Never done    Annual Dilated Retinal Exam Never done    Diabetic Foot Exam Never done    Medicare Annual Wellness Visit Never done    Hepatitis C Screening Never done    Zoster Vaccine Never done    RSV (60+ years old [shared decision making] or in pregnancy during 32 through 36 weeks) Never done    Breast Cancer Screening 04/25/2016    COVID-19 Vaccine 09/01/2023    Influenza Vaccine 08/01/2024     Health Maintenance Topics with due status: Not Due       Topic Last Completion Date    DTaP, Tdap, and Td Vaccines 11/19/2020    Diabetes Kidney Health Evaluation:  uACR 12/11/2023    Falls Risk Screening 12/11/2023    Depression Screening 04/01/2024    Diabetes Kidney Health Evaluation: eGFR 04/02/2024    Hemoglobin A1C 04/02/2024     Health Maintenance Topics with due status: Completed       Topic Last Completion Date    Pneumococcal (65 years and older) 02/04/2022     Health Maintenance Topics with due status: Aged Out       Topic Date Due    Meningococcal ACWY Aged Out    RSV <20 months Aged Out    HIB Vaccines Aged Out    Hepatitis B Vaccines Aged Out    IPV Vaccines Aged Out    HPV Vaccines Aged Out       You May Be Eligible for These Additional Preventive Services   (Assumes Average Risk Unless Otherwise Noted)  Diabetes Screening Any 1 risk factor: hypertension, dyslipidemia, obesity, high glucose; or Any 2 risk factors: >=64yo, overweight, family history diabetes (covered every 6 months)   Hepatitis C Screening Any 1 risk factor: 1) blood transfusion before 1992,   2) current or past injection drug use (annually for high risk; if born between 9991-1359, see above for status).   Vaccine: Hepatitis B As  necessary if at-risk: hemophilia, ESRD, diabetes, living with individual infected with hep B, healthcare worker with frequent contact with blood/bodily fluids (series covered once)   Sexually Transmitted Diseases (STDs) As necessary chlamydia, gonorrhea, syphilis, hepatitis B (covered annually)  HIV if any 1 risk factor present: 1) <16yo or >64yo and at increased risk or 2) 15-64yo and ask for it (covered annually)   Lung Cancer Screening Low dose chest CT if all three risk factors: 1) 50-76yo, 2) smoker or quit within last 15y, 3) >=20 pack years (covered annually).  No results found for this or any previous visit.       Cholesterol Screening Both risk factors: 1) >=21yo and 2)  increased risk coronary artery disease (covered every 5 years).   Breast Cancer Screening Covered once 35-40yo, annually >=39yo (if >=49yo, see above for status).   Glaucoma Screening Any 1 risk factor: 1) diabetes, 2) family history glaucoma, 3)  >=49yo, 4)  American >=64yo (covered annually)         Health Risk Factors with Personalized Education:  ----------------------------------------------------------------------------------------------------------------------  Controlling Your Blood Pressure  Maintain a normal weight (body mass index between 18.5 and 24.9).  Eat more fruit, vegetables and low-fat dairy.  Eat less saturated fat and total fat.  Lower your sodium (salt) intake.  Try to stay under 1500 mg per day, but if you cannot get your intake to be that low, at least lower it by 1000 mg.  Stay active.  Try to get at least 90 to 150 minutes of exercise per week.  Try brisk walking, swimming, bicycling or dancing.  Limit alcohol intake.  When you do consume alcohol, drink no more than 1 drink per day.  If you have been prescribed medication, take it regularly and exactly as prescribed.  Let your PCP know if you have any problems or questions about your medication.  Check your blood pressure at home or at the  store.  Write down your readings and share them with your PCP  ----------------------------------------------------------------------------------------------------------------------  Controlling Your Diabetes  Stress can raise your blood sugar.  Learn what causes your stress.  Learn to lower your stress by spending time with family and friends, exercising, deep breathing, trying meditation or yoga, enjoying hobbies and getting enough rest.  Let your PCP know if you’re having problems limiting your stress.  Maintain a healthy weight by balancing your diet and exercise.  Choose foods that are lower in calories, saturated fat, trans fat, sugar and salt.  Eat foods with more fiber, like whole grain cereals, bread, crackers, rice or pasta.  Choose to eat fruit, vegetables, and low-fat or fat-free/skim dairy.  Reduce the portion size of your meals.  Make half of your meal vegetables and fruit, and divide the other half between lean protein and whole grains.  Drink water instead of juice and regular soda.  Spend at least some time being active on most days of the week.  Work to increase your muscle strength at least twice per week.  Try things like light weights, stretch bands, yoga, heavy gardening (digging, planting with tools) or push-ups.  If you have been prescribed medication, take it regularly and exactly as prescribed.  Let your PCP know if you have any problems or questions about your medication.  If you have been asked to check your blood sugar, write down your readings and share them with your PCP  ----------------------------------------------------------------------------------------------------------------------  Controlling Your Cholesterol  Reduce the amount of saturated and trans fat in your diet.  Limit intake of red meat.  Consume only low-fat or non-fat/skim dairy.  Limit fried food.  Cook with vegetable oils.  Reduce your intake of sugary foods, sugary drinks and alcohol.  Eat a diet high in fruit,  vegetables and whole grains.  Get protein from fish, poultry and a small portion of nuts.  Stay active.  Try to get at least 90 to 150 minutes of exercise per week.  Try brisk walking, swimming, bicycling or dancing.  Maintain a healthy weight by balancing your diet and exercise.  If you have been prescribed medication, take it regularly and exactly as prescribed. Let your PCP know if you have any problems or questions about your medication.  It’s important to know your cholesterol numbers.  When recommended by your PCP, get the cholesterol blood test.  ---------------------------------------------------------------------------------------------------------------------   Controlling Your Osteoporosis, Strengthening Your Bones  Try to get at least 90 to 150 minutes of weight-bearing exercise per week.  Ensure intake of at least 1200mg of calcium per day.  Eat foods high in calcium like milk and other dairy, green vegetables, fruit, canned fish with soft and edible bones, nuts, calcium-set tofu.  Some foods are calcium-fortified, like bread, cereal, fruit juices and mineral water.  Help your body make vitamin D by getting 10-15 minutes per day of sunlight.    Ensure intake of at least 600IU of vitamin D per day.  Eat foods high in vitamin D like oily fish (salmon, sardines, mackerel) and eggs.  Some foods are fortified with vitamin D, like dairy and cereals.  Avoid high amounts of caffeine and salt, since they can cause the body to loose calcium.  Limit alcohol intake, since it is associated with weaker bones and is associated with falls and fractures.  Limit intake of fizzy drinks.  If you have been prescribed medication, take it regularly and exactly as prescribed.  Let your PCP know if you have any problems or questions about your medication  ----------------------------------------------------------------------------------------------------------------------  Reducing Your Risk of Falls  Tell your PCP if any of  your medications make you feel tired, dizzy, lightheaded or off-balance.  Maintain coordination, flexibility and balance by ensuring regular physical activity.  Limit alcohol intake to 1 drink per day.  Consider avoiding all alcohol intake.  Ensure good vision.  Visit an ophthalmologist or optometrist regularly for vision screening or to make sure your glasses / contact lens prescription is correct.  If you need glasses or contacts, wear them.  When you get new glasses or contacts, take time to get used to them.  Do not wear sunglasses or tinted lenses when indoors.  Ensure good hearing.  Have your hearing checked if you are having trouble hearing, or family and friends think you cannot hear them.  If you need a hearing aid, be sure it fits well and wear it.  Get enough rest.  Ensure about 7-9 hours of sleep every day.  Get up slowly from your bed or chairs.  Do not start walking until you are sure you feel steady.  Wear non-skid, rubber-soled, low-heeled shoes.  Do not walk in socks, or in shoes and slippers with smooth soles.  If your PCP or therapist recommends using a cane or walker, use it regularly.  Make your home safer.  Increase lighting throughout the house, especially at the top and bottom of stairs.  Ensure lighting is easily turned on when getting up in the middle of the night.  Make sure there are two secure rails on all stairs.  Install grab bars in the bathtub / shower and near the toilet.  Consider using a shower chair and / or a hand-held shower.  Spread sand or salt on icy surfaces.  Beware of wet surfaces, which can be icy.  Tell your PCP if you have fallen.

## 2024-08-13 NOTE — ASSESSMENT & PLAN NOTE
Managed by Tabatha Henderson, sports medicine with [facility] on Prolia every 6 months.  She does not take any calcium or vitamin D supplementation and vitamin D level was normal May 29, 2024.  She states her last DEXA scan was done a year ago and we will request those medical records from [facility].

## 2024-08-13 NOTE — ASSESSMENT & PLAN NOTE
She has normocytic anemia with hemoglobin at 10 which she states is her baseline.  States she has had this for years, it has been stable and has been evaluated by hematology which told her that this was normal for her.  I suspect she probably has a component of anemia of chronic kidney disease and will continue to monitor her blood count.

## 2024-08-24 DIAGNOSIS — N18.31 STAGE 3A CHRONIC KIDNEY DISEASE (CMS/HCC): ICD-10-CM

## 2024-08-26 ENCOUNTER — HOSPITAL ENCOUNTER (OUTPATIENT)
Dept: RADIOLOGY | Age: 69
Discharge: HOME | End: 2024-08-26
Attending: FAMILY MEDICINE
Payer: COMMERCIAL

## 2024-08-26 DIAGNOSIS — Z12.31 SCREENING MAMMOGRAM FOR BREAST CANCER: ICD-10-CM

## 2024-08-26 PROCEDURE — 77067 SCR MAMMO BI INCL CAD: CPT

## 2024-08-26 RX ORDER — LISINOPRIL 5 MG/1
5 TABLET ORAL DAILY
Qty: 90 TABLET | Refills: 3 | Status: SHIPPED | OUTPATIENT
Start: 2024-08-26 | End: 2024-09-22 | Stop reason: SDUPTHER

## 2024-08-26 NOTE — TELEPHONE ENCOUNTER
Medicine Refill Request    Last Office Visit: 8/13/2024   Last Consult Visit: Visit date not found  Last Telemedicine Visit: Visit date not found    Next Appointment: 11/20/2024      Current Outpatient Medications:     atorvastatin (LIPITOR) 40 mg tablet, Take 1 tablet (40 mg total) by mouth daily., Disp: 90 tablet, Rfl: 3    blood-glucose meter kit, 1 each daily. To monitor blood glucose, Disp: 1 each, Rfl: 0    lisinopriL (PRINIVIL) 5 mg tablet, Take 1 tablet (5 mg total) by mouth daily., Disp: 90 tablet, Rfl: 1    metFORMIN (GLUCOPHAGE) 1,000 mg tablet, Take 1 tablet (1,000 mg total) by mouth 2 (two) times a day with meals., Disp: 180 tablet, Rfl: 3    mupirocin (BACTROBAN) 2 % ointment, Apply 1 Application topically 2 (two) times a day., Disp: , Rfl:     omega 3-dha-epa-fish oil (Fish OiL) 1,000 mg (120 mg-180 mg) capsule, Take by mouth., Disp: , Rfl:     ONETOUCH DELICA PLUS LANCET 33 gauge misc, TEST EVERY DAY, Disp: , Rfl:     ONETOUCH ULTRA TEST strip, TEST EVERY DAY, Disp: , Rfl:       BP Readings from Last 3 Encounters:   08/13/24 120/60   04/01/24 110/68   12/11/23 120/60       Recent Lab results:  Lab Results   Component Value Date    CHOL 173 11/02/2023   ,   Lab Results   Component Value Date    HDL 50 11/02/2023   ,   Lab Results   Component Value Date    LDLCALC 96 11/02/2023   ,   Lab Results   Component Value Date    TRIG 153 (H) 11/02/2023        Lab Results   Component Value Date    GLUCOSE 148 (H) 04/02/2024   ,   Lab Results   Component Value Date    HGBA1C 6.4 (H) 04/02/2024         Lab Results   Component Value Date    CREATININE 1.63 (H) 04/02/2024       Lab Results   Component Value Date    TSH 0.836 04/02/2024           Lab Results   Component Value Date    HGBA1C 6.4 (H) 04/02/2024

## 2024-08-27 ENCOUNTER — APPOINTMENT (OUTPATIENT)
Dept: RADIOLOGY | Age: 69
End: 2024-08-27
Payer: COMMERCIAL

## 2024-09-22 DIAGNOSIS — N18.31 STAGE 3A CHRONIC KIDNEY DISEASE (CMS/HCC): ICD-10-CM

## 2024-09-23 RX ORDER — LISINOPRIL 5 MG/1
5 TABLET ORAL DAILY
Qty: 90 TABLET | Refills: 3 | Status: SHIPPED | OUTPATIENT
Start: 2024-09-23 | End: 2025-09-18

## 2024-09-23 NOTE — TELEPHONE ENCOUNTER
Medicine Refill Request    Last Office Visit: 8/13/2024   Last Consult Visit: Visit date not found  Last Telemedicine Visit: Visit date not found    Next Appointment: 11/20/2024      Current Outpatient Medications:     atorvastatin (LIPITOR) 40 mg tablet, Take 1 tablet (40 mg total) by mouth daily., Disp: 90 tablet, Rfl: 3    blood-glucose meter kit, 1 each daily. To monitor blood glucose, Disp: 1 each, Rfl: 0    lisinopriL (PRINIVIL) 5 mg tablet, TAKE 1 TABLET (5 MG TOTAL) BY MOUTH DAILY., Disp: 90 tablet, Rfl: 3    metFORMIN (GLUCOPHAGE) 1,000 mg tablet, Take 1 tablet (1,000 mg total) by mouth 2 (two) times a day with meals., Disp: 180 tablet, Rfl: 3    mupirocin (BACTROBAN) 2 % ointment, Apply 1 Application topically 2 (two) times a day., Disp: , Rfl:     omega 3-dha-epa-fish oil (Fish OiL) 1,000 mg (120 mg-180 mg) capsule, Take by mouth., Disp: , Rfl:     ONETOUCH DELICA PLUS LANCET 33 gauge misc, TEST EVERY DAY, Disp: , Rfl:     ONETOUCH ULTRA TEST strip, TEST EVERY DAY, Disp: , Rfl:       BP Readings from Last 3 Encounters:   08/13/24 120/60   04/01/24 110/68   12/11/23 120/60       Recent Lab results:  Lab Results   Component Value Date    CHOL 173 11/02/2023   ,   Lab Results   Component Value Date    HDL 50 11/02/2023   ,   Lab Results   Component Value Date    LDLCALC 96 11/02/2023   ,   Lab Results   Component Value Date    TRIG 153 (H) 11/02/2023        Lab Results   Component Value Date    GLUCOSE 148 (H) 04/02/2024   ,   Lab Results   Component Value Date    HGBA1C 6.4 (H) 04/02/2024         Lab Results   Component Value Date    CREATININE 1.63 (H) 04/02/2024       Lab Results   Component Value Date    TSH 0.836 04/02/2024           Lab Results   Component Value Date    HGBA1C 6.4 (H) 04/02/2024      Burow's Graft Text: The defect edges were debeveled with a #15 scalpel blade.  Given the location of the defect, shape of the defect, the proximity to free margins and the presence of a standing cone deformity a Burow's skin graft was deemed most appropriate. The standing cone was removed and this tissue was then trimmed to the shape of the primary defect. The adipose tissue was also removed until only dermis and epidermis were left.  The skin margins of the secondary defect were undermined to an appropriate distance in all directions utilizing iris scissors.  The secondary defect was closed with interrupted buried subcutaneous sutures.  The skin edges were then re-apposed with running  sutures.  The skin graft was then placed in the primary defect and oriented appropriately.

## 2024-09-27 ENCOUNTER — TELEPHONE (OUTPATIENT)
Dept: FAMILY MEDICINE | Facility: CLINIC | Age: 69
End: 2024-09-27
Payer: COMMERCIAL

## 2024-09-27 NOTE — TELEPHONE ENCOUNTER
----- Message from Wendy Tobar sent at 8/13/2024  4:30 PM EDT -----  I need the following information on this pt:  - Mammo done at [facility] 2023  - DEXA done at [facility] 2023  - Colonoscopy done by Dr. Connors about 2 years ago.  - Dates of Shingrix vaccine done at [facility] on [address] in [place].  - Medical records from Georgette Henderson with [facility]  - Medical records from her previous PCP with [facility]

## 2024-12-13 ENCOUNTER — TRANSCRIBE ORDERS (OUTPATIENT)
Dept: SCHEDULING | Age: 69
End: 2024-12-13

## 2024-12-14 ENCOUNTER — TRANSCRIBE ORDERS (OUTPATIENT)
Dept: SCHEDULING | Age: 69
End: 2024-12-14

## 2024-12-14 DIAGNOSIS — J32.4 CHRONIC PANSINUSITIS: Primary | ICD-10-CM

## 2024-12-18 ENCOUNTER — HOSPITAL ENCOUNTER (OUTPATIENT)
Dept: RADIOLOGY | Age: 69
Discharge: HOME | End: 2024-12-18
Attending: OTOLARYNGOLOGY
Payer: COMMERCIAL

## 2024-12-18 DIAGNOSIS — J32.4 CHRONIC PANSINUSITIS: ICD-10-CM

## 2024-12-18 PROCEDURE — 70486 CT MAXILLOFACIAL W/O DYE: CPT

## 2025-01-18 LAB
ALBUMIN SERPL-MCNC: 4.4 G/DL (ref 3.8–4.8)
ALBUMIN/CREAT UR: 57 MG/G CREAT (ref 0–29)
ALP SERPL-CCNC: 46 IU/L (ref 44–121)
ALT SERPL-CCNC: 18 IU/L (ref 0–32)
AST SERPL-CCNC: 23 IU/L (ref 0–40)
BASOPHILS # BLD AUTO: 0 X10E3/UL (ref 0–0.2)
BASOPHILS NFR BLD AUTO: 0 %
BILIRUB SERPL-MCNC: <0.2 MG/DL (ref 0–1.2)
BUN SERPL-MCNC: 33 MG/DL (ref 8–27)
BUN/CREAT SERPL: 24 (ref 12–28)
CALCIUM SERPL-MCNC: 10.2 MG/DL (ref 8.7–10.3)
CHLORIDE SERPL-SCNC: 101 MMOL/L (ref 96–106)
CHOLEST SERPL-MCNC: 173 MG/DL (ref 100–199)
CO2 SERPL-SCNC: 26 MMOL/L (ref 20–29)
CREAT SERPL-MCNC: 1.38 MG/DL (ref 0.57–1)
CREAT UR-MCNC: 50 MG/DL
EGFRCR SERPLBLD CKD-EPI 2021: 41 ML/MIN/1.73
EOSINOPHIL # BLD AUTO: 0.1 X10E3/UL (ref 0–0.4)
EOSINOPHIL NFR BLD AUTO: 1 %
ERYTHROCYTE [DISTWIDTH] IN BLOOD BY AUTOMATED COUNT: 12.9 % (ref 11.7–15.4)
GLOBULIN SER CALC-MCNC: 1.8 G/DL (ref 1.5–4.5)
GLUCOSE SERPL-MCNC: 159 MG/DL (ref 70–99)
HBA1C MFR BLD: 6.4 % (ref 4.8–5.6)
HCT VFR BLD AUTO: 28.2 % (ref 34–46.6)
HCV IGG SERPL QL IA: NON REACTIVE
HDLC SERPL-MCNC: 61 MG/DL
HGB BLD-MCNC: 9 G/DL (ref 11.1–15.9)
IMM GRANULOCYTES # BLD AUTO: 0 X10E3/UL (ref 0–0.1)
IMM GRANULOCYTES NFR BLD AUTO: 0 %
LDLC SERPL CALC-MCNC: 96 MG/DL (ref 0–99)
LYMPHOCYTES # BLD AUTO: 1.7 X10E3/UL (ref 0.7–3.1)
LYMPHOCYTES NFR BLD AUTO: 22 %
MCH RBC QN AUTO: 28.2 PG (ref 26.6–33)
MCHC RBC AUTO-ENTMCNC: 31.9 G/DL (ref 31.5–35.7)
MCV RBC AUTO: 88 FL (ref 79–97)
MICROALBUMIN UR-MCNC: 28.4 UG/ML
MONOCYTES # BLD AUTO: 0.7 X10E3/UL (ref 0.1–0.9)
MONOCYTES NFR BLD AUTO: 9 %
NEUTROPHILS # BLD AUTO: 5.2 X10E3/UL (ref 1.4–7)
NEUTROPHILS NFR BLD AUTO: 68 %
PLATELET # BLD AUTO: 384 X10E3/UL (ref 150–450)
POTASSIUM SERPL-SCNC: 4.9 MMOL/L (ref 3.5–5.2)
PROT SERPL-MCNC: 6.2 G/DL (ref 6–8.5)
PTH-INTACT SERPL-MCNC: 16 PG/ML (ref 15–65)
RBC # BLD AUTO: 3.19 X10E6/UL (ref 3.77–5.28)
SODIUM SERPL-SCNC: 139 MMOL/L (ref 134–144)
TRIGL SERPL-MCNC: 85 MG/DL (ref 0–149)
VLDLC SERPL CALC-MCNC: 16 MG/DL (ref 5–40)
WBC # BLD AUTO: 7.8 X10E3/UL (ref 3.4–10.8)

## 2025-01-20 DIAGNOSIS — D64.9 ANEMIA, UNSPECIFIED TYPE: Primary | ICD-10-CM

## 2025-01-20 NOTE — Clinical Note
Please call lab and find out if they can add iron studies to her recent blood work.  I placed orders in the system.

## 2025-01-21 ENCOUNTER — TELEPHONE (OUTPATIENT)
Dept: FAMILY MEDICINE | Facility: CLINIC | Age: 70
End: 2025-01-21

## 2025-01-21 ENCOUNTER — OFFICE VISIT (OUTPATIENT)
Dept: FAMILY MEDICINE | Facility: CLINIC | Age: 70
End: 2025-01-21
Payer: COMMERCIAL

## 2025-01-21 VITALS
WEIGHT: 118 LBS | DIASTOLIC BLOOD PRESSURE: 68 MMHG | RESPIRATION RATE: 16 BRPM | HEART RATE: 93 BPM | OXYGEN SATURATION: 98 % | BODY MASS INDEX: 21.71 KG/M2 | SYSTOLIC BLOOD PRESSURE: 124 MMHG | TEMPERATURE: 97.8 F | HEIGHT: 62 IN

## 2025-01-21 DIAGNOSIS — E11.22 TYPE 2 DIABETES MELLITUS WITH STAGE 3B CHRONIC KIDNEY DISEASE, WITHOUT LONG-TERM CURRENT USE OF INSULIN (CMS/HCC): ICD-10-CM

## 2025-01-21 DIAGNOSIS — E78.49 OTHER HYPERLIPIDEMIA: ICD-10-CM

## 2025-01-21 DIAGNOSIS — N18.32 STAGE 3B CHRONIC KIDNEY DISEASE (CMS/HCC): ICD-10-CM

## 2025-01-21 DIAGNOSIS — D64.9 ANEMIA, UNSPECIFIED TYPE: Primary | ICD-10-CM

## 2025-01-21 DIAGNOSIS — N18.30 BENIGN HYPERTENSION WITH CHRONIC KIDNEY DISEASE, STAGE III (CMS/HCC): ICD-10-CM

## 2025-01-21 DIAGNOSIS — N18.32 TYPE 2 DIABETES MELLITUS WITH STAGE 3B CHRONIC KIDNEY DISEASE, WITHOUT LONG-TERM CURRENT USE OF INSULIN (CMS/HCC): ICD-10-CM

## 2025-01-21 DIAGNOSIS — I12.9 BENIGN HYPERTENSION WITH CHRONIC KIDNEY DISEASE, STAGE III (CMS/HCC): ICD-10-CM

## 2025-01-21 PROBLEM — E83.52 HYPERCALCEMIA: Status: RESOLVED | Noted: 2023-12-11 | Resolved: 2025-01-21

## 2025-01-21 PROCEDURE — 99214 OFFICE O/P EST MOD 30 MIN: CPT | Performed by: FAMILY MEDICINE

## 2025-01-21 PROCEDURE — 3008F BODY MASS INDEX DOCD: CPT | Performed by: FAMILY MEDICINE

## 2025-01-21 PROCEDURE — 3078F DIAST BP <80 MM HG: CPT | Performed by: FAMILY MEDICINE

## 2025-01-21 PROCEDURE — 3074F SYST BP LT 130 MM HG: CPT | Performed by: FAMILY MEDICINE

## 2025-01-21 ASSESSMENT — ENCOUNTER SYMPTOMS
RESPIRATORY NEGATIVE: 1
CONSTITUTIONAL NEGATIVE: 1
CARDIOVASCULAR NEGATIVE: 1
PSYCHIATRIC NEGATIVE: 1

## 2025-01-21 ASSESSMENT — PATIENT HEALTH QUESTIONNAIRE - PHQ9: SUM OF ALL RESPONSES TO PHQ9 QUESTIONS 1 & 2: 0

## 2025-01-21 NOTE — ASSESSMENT & PLAN NOTE
Hemoglobin further decreased to 9 on recent blood work and I suspect anemia of chronic kidney disease.  Will add iron studies to recent blood work and continue to monitor her hemoglobin and refer her back to hematology if it continues to drop.  States she has been told in the past that this is just normal for her which does not make any sense.  Orders:    Iron and TIBC; Future    Ferritin; Future    Folate; Future    Vitamin B12; Future

## 2025-01-21 NOTE — ASSESSMENT & PLAN NOTE
Blood sugars controlled with most recent hemoglobin A1c 6.4% on January 17, 2025 on metformin 1 g twice daily which she will continue.  We will get her diabetic eye exam results and see her back in 7 months.  Orders:    Hemoglobin A1c; Future    Lipid panel; Future    Microalbumin / creatinine urine ratio; Future

## 2025-01-21 NOTE — ASSESSMENT & PLAN NOTE
Hypertension with hypertensive chronic kidney disease with chronic kidney disease stage IIIb.  Her blood pressure is controlled on lisinopril 5 mg daily and her renal function is stable.  She avoids NSAIDs.

## 2025-01-21 NOTE — ASSESSMENT & PLAN NOTE
Renal function is stable.  Blood pressure and blood sugar are controlled.  She avoids NSAIDs.  Orders:    TSH w reflex FT4; Future    Vitamin D 25 hydroxy; Future    CBC and differential; Future    Comprehensive metabolic panel; Future

## 2025-01-21 NOTE — PROGRESS NOTES
"Subjective      Patient ID: Talita Jacques is a 69 y.o. female.  1955      This 69-year-old  female presents 5 months since I last saw her without any acute complaints for follow-up.  She continues to have difficulty with her sinuses and is now seeing Dr. Barber.  After antibiotics failed to improve her sinus symptoms allergy testing was done and she is confused about why testing on her back showed multiple allergies but blood work did not.  She is compliant with her medication.        The following have been reviewed and updated as appropriate in this visit:        Review of Systems   Constitutional: Negative.    Respiratory: Negative.     Cardiovascular: Negative.    Psychiatric/Behavioral: Negative.         Objective     Vitals:    01/21/25 1613   BP: 124/68   Pulse: 93   Resp: 16   Temp: 36.6 °C (97.8 °F)   SpO2: 98%   Weight: 53.5 kg (118 lb)   Height: 1.575 m (5' 2\")     Body mass index is 21.58 kg/m².    Physical Exam  Vitals reviewed.   Constitutional:       General: She is not in acute distress.     Appearance: She is normal weight. She is not ill-appearing.      Comments: She weighs 3 pounds more than she did 5 months ago.   Cardiovascular:      Rate and Rhythm: Normal rate and regular rhythm.      Heart sounds: Normal heart sounds.   Pulmonary:      Effort: Pulmonary effort is normal.      Breath sounds: Normal breath sounds.   Musculoskeletal:      Cervical back: Neck supple.      Right lower leg: No edema.      Left lower leg: No edema.   Lymphadenopathy:      Cervical: No cervical adenopathy.   Skin:     General: Skin is warm and dry.   Neurological:      Mental Status: She is alert.   Psychiatric:         Mood and Affect: Mood normal.       Lab Results   Component Value Date    WBC 7.8 01/17/2025    HGB 9.0 (L) 01/17/2025    HCT 28.2 (L) 01/17/2025     01/17/2025    CHOL 173 01/17/2025    TRIG 85 01/17/2025    HDL 61 01/17/2025    ALT 18 01/17/2025    AST 23 01/17/2025     " 01/17/2025    K 4.9 01/17/2025     01/17/2025    CREATININE 1.38 (H) 01/17/2025    BUN 33 (H) 01/17/2025    CO2 26 01/17/2025    TSH 0.836 04/02/2024    HGBA1C 6.4 (H) 01/17/2025    MICROALBUR 28.4 01/17/2025      Assessment & Plan  Type 2 diabetes mellitus with stage 3b chronic kidney disease, without long-term current use of insulin (CMS/formerly [facility])  Blood sugars controlled with most recent hemoglobin A1c 6.4% on January 17, 2025 on metformin 1 g twice daily which she will continue.  We will get her diabetic eye exam results and see her back in 7 months.  Orders:    Hemoglobin A1c; Future    Lipid panel; Future    Microalbumin / creatinine urine ratio; Future    Stage 3b chronic kidney disease (CMS/formerly [facility])  Renal function is stable.  Blood pressure and blood sugar are controlled.  She avoids NSAIDs.  Orders:    TSH w reflex FT4; Future    Vitamin D 25 hydroxy; Future    CBC and differential; Future    Comprehensive metabolic panel; Future    Anemia, unspecified type  Hemoglobin further decreased to 9 on recent blood work and I suspect anemia of chronic kidney disease.  Will add iron studies to recent blood work and continue to monitor her hemoglobin and refer her back to hematology if it continues to drop.  States she has been told in the past that this is just normal for her which does not make any sense.  Orders:    Iron and TIBC; Future    Ferritin; Future    Folate; Future    Vitamin B12; Future    Benign hypertension with chronic kidney disease, stage III (CMS/formerly [facility])  Hypertension with hypertensive chronic kidney disease with chronic kidney disease stage IIIb.  Her blood pressure is controlled on lisinopril 5 mg daily and her renal function is stable.  She avoids NSAIDs.         Other hyperlipidemia  At goal on atorvastatin which she will continue.

## 2025-01-23 DIAGNOSIS — E61.1 IRON DEFICIENCY: Primary | ICD-10-CM

## 2025-01-23 DIAGNOSIS — N18.31 TYPE 2 DIABETES MELLITUS WITH STAGE 3A CHRONIC KIDNEY DISEASE, WITHOUT LONG-TERM CURRENT USE OF INSULIN (CMS/HCC): ICD-10-CM

## 2025-01-23 DIAGNOSIS — E11.22 TYPE 2 DIABETES MELLITUS WITH STAGE 3A CHRONIC KIDNEY DISEASE, WITHOUT LONG-TERM CURRENT USE OF INSULIN (CMS/HCC): ICD-10-CM

## 2025-01-23 LAB
FERRITIN SERPL-MCNC: 14 NG/ML (ref 15–150)
IRON SATN MFR SERPL: 14 % (ref 15–55)
IRON SERPL-MCNC: 47 UG/DL (ref 27–139)
TIBC SERPL-MCNC: 340 UG/DL (ref 250–450)
UIBC SERPL-MCNC: 293 UG/DL (ref 118–369)

## 2025-01-23 RX ORDER — METFORMIN HYDROCHLORIDE 1000 MG/1
1000 TABLET ORAL 2 TIMES DAILY WITH MEALS
Qty: 180 TABLET | Refills: 3 | Status: SHIPPED | OUTPATIENT
Start: 2025-01-23

## 2025-01-23 RX ORDER — FERROUS SULFATE 325(65) MG
325 TABLET, DELAYED RELEASE (ENTERIC COATED) ORAL DAILY
Start: 2025-01-23 | End: 2026-01-23

## 2025-01-23 NOTE — TELEPHONE ENCOUNTER
Medicine Refill Request    Last Office Visit: 1/21/2025   Last Consult Visit: Visit date not found  Last Telemedicine Visit: Visit date not found    Next Appointment: 8/21/2025      Current Outpatient Medications:     atorvastatin (LIPITOR) 40 mg tablet, Take 1 tablet (40 mg total) by mouth daily., Disp: 90 tablet, Rfl: 3    denosumab (PROLIA) 60 mg/mL syringe, Inject 60 mg under the skin once., Disp: , Rfl:     lisinopriL (PRINIVIL) 5 mg tablet, Take 1 tablet (5 mg total) by mouth daily., Disp: 90 tablet, Rfl: 3    metFORMIN (GLUCOPHAGE) 1,000 mg tablet, Take 1 tablet (1,000 mg total) by mouth 2 (two) times a day with meals., Disp: 180 tablet, Rfl: 3    omega 3-dha-epa-fish oil (Fish OiL) 1,000 mg (120 mg-180 mg) capsule, Take by mouth., Disp: , Rfl:     ONETOUCH DELICA PLUS LANCET 33 gauge misc, TEST EVERY DAY, Disp: , Rfl:     ONETOUCH ULTRA TEST strip, TEST EVERY DAY, Disp: , Rfl:       BP Readings from Last 3 Encounters:   01/21/25 124/68   08/13/24 120/60   04/01/24 110/68       Recent Lab results:  Lab Results   Component Value Date    CHOL 173 01/17/2025   ,   Lab Results   Component Value Date    HDL 61 01/17/2025   ,   Lab Results   Component Value Date    LDLCALC 96 01/17/2025   ,   Lab Results   Component Value Date    TRIG 85 01/17/2025        Lab Results   Component Value Date    GLUCOSE 159 (H) 01/17/2025   ,   Lab Results   Component Value Date    HGBA1C 6.4 (H) 01/17/2025         Lab Results   Component Value Date    CREATININE 1.38 (H) 01/17/2025       Lab Results   Component Value Date    TSH 0.836 04/02/2024           Lab Results   Component Value Date    HGBA1C 6.4 (H) 01/17/2025

## 2025-01-23 NOTE — PROGRESS NOTES
Iron studies show iron deficiency and patient to start ferrous sulfate 325 mg daily over-the-counter.

## 2025-01-24 NOTE — TELEPHONE ENCOUNTER
----- Message from Wendy Tobar sent at 1/23/2025  5:19 PM EST -----  Please let patient know that her recent blood work confirmed that her iron level is low which is contributing to her low blood count/anemia.  I recommend she start iron supplements, ferrous sulfate 325 mg daily over-the-counter and we will plan to repeat her blood work in 3 months.

## 2025-01-24 NOTE — TELEPHONE ENCOUNTER
Medicine Refill Request    Last Office Visit: 1/21/2025   Last Consult Visit: Visit date not found  Last Telemedicine Visit: Visit date not found    Next Appointment: 8/21/2025      Current Outpatient Medications:     atorvastatin (LIPITOR) 40 mg tablet, Take 1 tablet (40 mg total) by mouth daily., Disp: 90 tablet, Rfl: 3    denosumab (PROLIA) 60 mg/mL syringe, Inject 60 mg under the skin once., Disp: , Rfl:     ferrous sulfate 325 mg (65 mg iron) EC tablet, Take 1 tablet (325 mg total) by mouth daily., Disp: , Rfl:     lisinopriL (PRINIVIL) 5 mg tablet, Take 1 tablet (5 mg total) by mouth daily., Disp: 90 tablet, Rfl: 3    metFORMIN (GLUCOPHAGE) 1,000 mg tablet, Take 1 tablet (1,000 mg total) by mouth 2 (two) times a day with meals., Disp: 180 tablet, Rfl: 3    omega 3-dha-epa-fish oil (Fish OiL) 1,000 mg (120 mg-180 mg) capsule, Take by mouth., Disp: , Rfl:     ONETOUCH DELICA PLUS LANCET 33 gauge misc, TEST EVERY DAY, Disp: , Rfl:     ONETOUCH ULTRA TEST strip, TEST EVERY DAY, Disp: , Rfl:       BP Readings from Last 3 Encounters:   01/21/25 124/68   08/13/24 120/60   04/01/24 110/68       Recent Lab results:  Lab Results   Component Value Date    CHOL 173 01/17/2025   ,   Lab Results   Component Value Date    HDL 61 01/17/2025   ,   Lab Results   Component Value Date    LDLCALC 96 01/17/2025   ,   Lab Results   Component Value Date    TRIG 85 01/17/2025        Lab Results   Component Value Date    GLUCOSE 159 (H) 01/17/2025   ,   Lab Results   Component Value Date    HGBA1C 6.4 (H) 01/17/2025         Lab Results   Component Value Date    CREATININE 1.38 (H) 01/17/2025       Lab Results   Component Value Date    TSH 0.836 04/02/2024           Lab Results   Component Value Date    HGBA1C 6.4 (H) 01/17/2025

## 2025-02-03 ENCOUNTER — TELEPHONE (OUTPATIENT)
Dept: FAMILY MEDICINE | Facility: CLINIC | Age: 70
End: 2025-02-03
Payer: COMMERCIAL

## 2025-02-03 NOTE — TELEPHONE ENCOUNTER
Ortho referral     Confirmation - Referral [id number]  Effective: 02/03/2025     Expires: 05/04/2025

## 2025-02-10 ENCOUNTER — HOSPITAL ENCOUNTER (OUTPATIENT)
Dept: RADIOLOGY | Age: 70
Discharge: HOME | End: 2025-02-10
Attending: FAMILY MEDICINE
Payer: COMMERCIAL

## 2025-02-10 ENCOUNTER — TRANSCRIBE ORDERS (OUTPATIENT)
Dept: RADIOLOGY | Age: 70
End: 2025-02-10

## 2025-02-10 DIAGNOSIS — M81.0 AGE-RELATED OSTEOPOROSIS WITHOUT CURRENT PATHOLOGICAL FRACTURE: Primary | ICD-10-CM

## 2025-02-10 DIAGNOSIS — M81.0 AGE-RELATED OSTEOPOROSIS WITHOUT CURRENT PATHOLOGICAL FRACTURE: ICD-10-CM

## 2025-02-10 PROCEDURE — 77080 DXA BONE DENSITY AXIAL: CPT

## 2025-02-13 LAB — SPECIMEN STATUS: NORMAL

## 2025-02-14 ENCOUNTER — TELEPHONE (OUTPATIENT)
Dept: FAMILY MEDICINE | Facility: CLINIC | Age: 70
End: 2025-02-14
Payer: COMMERCIAL

## 2025-02-14 NOTE — TELEPHONE ENCOUNTER
Relayed message to pt who had already started iron supplements and will get blood work done before August appt.

## 2025-02-14 NOTE — TELEPHONE ENCOUNTER
----- Message from Winnie Shrestha sent at 2/14/2025  4:19 PM EST -----  Please follow up and relay results message to patient. TY

## 2025-04-16 DIAGNOSIS — E78.9 LIPID DISORDER: ICD-10-CM

## 2025-04-16 RX ORDER — ATORVASTATIN CALCIUM 40 MG/1
40 TABLET, FILM COATED ORAL DAILY
Qty: 90 TABLET | Refills: 3 | Status: SHIPPED | OUTPATIENT
Start: 2025-04-16

## 2025-07-07 RX ORDER — BLOOD SUGAR DIAGNOSTIC
STRIP MISCELLANEOUS
OUTPATIENT
Start: 2025-07-07

## 2025-07-08 DIAGNOSIS — N18.32 TYPE 2 DIABETES MELLITUS WITH STAGE 3B CHRONIC KIDNEY DISEASE, WITHOUT LONG-TERM CURRENT USE OF INSULIN (CMS/HCC): Primary | ICD-10-CM

## 2025-07-08 DIAGNOSIS — E11.22 TYPE 2 DIABETES MELLITUS WITH STAGE 3B CHRONIC KIDNEY DISEASE, WITHOUT LONG-TERM CURRENT USE OF INSULIN (CMS/HCC): Primary | ICD-10-CM

## 2025-07-08 RX ORDER — DEXTROSE 4 G
TABLET,CHEWABLE ORAL
COMMUNITY
End: 2025-07-08 | Stop reason: SDUPTHER

## 2025-07-08 RX ORDER — DEXTROSE 4 G
TABLET,CHEWABLE ORAL
Qty: 1 EACH | Refills: 1 | Status: SHIPPED | OUTPATIENT
Start: 2025-07-08

## 2025-07-10 DIAGNOSIS — N18.32 TYPE 2 DIABETES MELLITUS WITH STAGE 3B CHRONIC KIDNEY DISEASE, WITHOUT LONG-TERM CURRENT USE OF INSULIN (CMS/HCC): Primary | ICD-10-CM

## 2025-07-10 DIAGNOSIS — E11.22 TYPE 2 DIABETES MELLITUS WITH STAGE 3B CHRONIC KIDNEY DISEASE, WITHOUT LONG-TERM CURRENT USE OF INSULIN (CMS/HCC): Primary | ICD-10-CM

## 2025-07-10 RX ORDER — BLOOD SUGAR DIAGNOSTIC
STRIP MISCELLANEOUS
Qty: 200 STRIP | Refills: 3 | Status: SHIPPED | OUTPATIENT
Start: 2025-07-10

## 2025-07-10 NOTE — TELEPHONE ENCOUNTER
Medicine Refill Request    Last Office Visit: 1/21/2025   Last Consult Visit: Visit date not found  Last Telemedicine Visit: Visit date not found    Next Appointment: 8/21/2025      Current Outpatient Medications:     atorvastatin (LIPITOR) 40 mg tablet, Take 1 tablet (40 mg total) by mouth daily., Disp: 90 tablet, Rfl: 3    blood-glucose meter misc, ONE TOUCH ULTRA To check blood glucose up to 3 times per day., Disp: 1 each, Rfl: 1    blood-glucose meter misc, ONE TOUCH ULTRA TWO Check blood glucose up to three times per day, Disp: 1 each, Rfl: 1    denosumab (PROLIA) 60 mg/mL syringe, Inject 60 mg under the skin once., Disp: , Rfl:     ferrous sulfate 325 mg (65 mg iron) EC tablet, Take 1 tablet (325 mg total) by mouth daily., Disp: , Rfl:     lisinopriL (PRINIVIL) 5 mg tablet, Take 1 tablet (5 mg total) by mouth daily., Disp: 90 tablet, Rfl: 3    metFORMIN (GLUCOPHAGE) 1,000 mg tablet, Take 1 tablet (1,000 mg total) by mouth 2 (two) times a day with meals., Disp: 180 tablet, Rfl: 3    omega 3-dha-epa-fish oil (Fish OiL) 1,000 mg (120 mg-180 mg) capsule, Take by mouth., Disp: , Rfl:     ONETOUCH DELICA PLUS LANCET 33 gauge misc, TEST EVERY DAY, Disp: , Rfl:     ONETOUCH ULTRA TEST strip, TEST EVERY DAY, Disp: , Rfl:     BP Readings from Last 3 Encounters:   01/21/25 124/68   08/13/24 120/60   04/01/24 110/68       Recent Lab results:  Lab Results   Component Value Date    CHOL 173 01/17/2025   ,   Lab Results   Component Value Date    HDL 61 01/17/2025   ,   Lab Results   Component Value Date    LDLCALC 96 01/17/2025   ,   Lab Results   Component Value Date    TRIG 85 01/17/2025        Lab Results   Component Value Date    GLUCOSE 159 (H) 01/17/2025   ,   Lab Results   Component Value Date    HGBA1C 6.4 (H) 01/17/2025         Lab Results   Component Value Date    CREATININE 1.38 (H) 01/17/2025       Lab Results   Component Value Date    TSH 0.836 04/02/2024           Lab Results   Component Value Date    HGBA1C  6.4 (H) 01/17/2025